# Patient Record
Sex: FEMALE | Race: WHITE | Employment: OTHER | URBAN - METROPOLITAN AREA
[De-identification: names, ages, dates, MRNs, and addresses within clinical notes are randomized per-mention and may not be internally consistent; named-entity substitution may affect disease eponyms.]

---

## 2017-08-21 ENCOUNTER — HOSPITAL ENCOUNTER (OUTPATIENT)
Dept: SURGERY | Age: 77
Discharge: HOME | End: 2017-08-21
Attending: ORTHOPAEDIC SURGERY | Admitting: ORTHOPAEDIC SURGERY

## 2017-08-21 ENCOUNTER — TELEPHONE (OUTPATIENT)
Dept: ORTHOPEDIC SURGERY | Age: 77
End: 2017-08-21

## 2017-08-21 VITALS
HEART RATE: 60 BPM | RESPIRATION RATE: 16 BRPM | SYSTOLIC BLOOD PRESSURE: 178 MMHG | BODY MASS INDEX: 24.28 KG/M2 | WEIGHT: 141.43 LBS | DIASTOLIC BLOOD PRESSURE: 68 MMHG | OXYGEN SATURATION: 97 % | TEMPERATURE: 96.9 F

## 2017-08-21 LAB
ANION GAP SERPL CALCULATED.3IONS-SCNC: 15 MMOL/L (ref 3–16)
BUN BLDV-MCNC: 70 MG/DL (ref 7–20)
CALCIUM SERPL-MCNC: 9.4 MG/DL (ref 8.3–10.6)
CHLORIDE BLD-SCNC: 100 MMOL/L (ref 99–110)
CO2: 27 MMOL/L (ref 21–32)
CREAT SERPL-MCNC: 1.6 MG/DL (ref 0.6–1.2)
GFR AFRICAN AMERICAN: 38
GFR NON-AFRICAN AMERICAN: 31
GLUCOSE BLD-MCNC: 116 MG/DL (ref 70–99)
HCT VFR BLD CALC: 35.4 % (ref 36–48)
HEMOGLOBIN: 11.9 G/DL (ref 12–16)
MCH RBC QN AUTO: 32.2 PG (ref 26–34)
MCHC RBC AUTO-ENTMCNC: 33.7 G/DL (ref 31–36)
MCV RBC AUTO: 95.5 FL (ref 80–100)
PDW BLD-RTO: 14.8 % (ref 12.4–15.4)
PLATELET # BLD: 146 K/UL (ref 135–450)
PMV BLD AUTO: 10.3 FL (ref 5–10.5)
POTASSIUM SERPL-SCNC: 4 MMOL/L (ref 3.5–5.1)
RBC # BLD: 3.7 M/UL (ref 4–5.2)
SODIUM BLD-SCNC: 142 MMOL/L (ref 136–145)
WBC # BLD: 10 K/UL (ref 4–11)

## 2017-08-21 PROCEDURE — 99204 OFFICE O/P NEW MOD 45 MIN: CPT | Performed by: ORTHOPAEDIC SURGERY

## 2017-08-21 PROCEDURE — 25609 OPTX DST RD XART FX/EP SEP3+: CPT | Performed by: NURSE PRACTITIONER

## 2017-08-21 PROCEDURE — 25609 OPTX DST RD XART FX/EP SEP3+: CPT | Performed by: ORTHOPAEDIC SURGERY

## 2017-08-21 RX ORDER — FENTANYL CITRATE 50 UG/ML
50 INJECTION, SOLUTION INTRAMUSCULAR; INTRAVENOUS EVERY 5 MIN PRN
Status: DISCONTINUED | OUTPATIENT
Start: 2017-08-21 | End: 2017-08-22 | Stop reason: HOSPADM

## 2017-08-21 RX ORDER — SODIUM CHLORIDE 0.9 % (FLUSH) 0.9 %
10 SYRINGE (ML) INJECTION EVERY 12 HOURS SCHEDULED
Status: DISCONTINUED | OUTPATIENT
Start: 2017-08-21 | End: 2017-08-22 | Stop reason: HOSPADM

## 2017-08-21 RX ORDER — CEPHALEXIN 500 MG/1
500 CAPSULE ORAL 4 TIMES DAILY
Qty: 40 CAPSULE | Refills: 0 | Status: SHIPPED | OUTPATIENT
Start: 2017-08-21 | End: 2017-08-26

## 2017-08-21 RX ORDER — SODIUM CHLORIDE 9 MG/ML
INJECTION, SOLUTION INTRAVENOUS CONTINUOUS
Status: DISCONTINUED | OUTPATIENT
Start: 2017-08-21 | End: 2017-08-22 | Stop reason: HOSPADM

## 2017-08-21 RX ORDER — PROMETHAZINE HYDROCHLORIDE 25 MG/1
25 TABLET ORAL EVERY 6 HOURS PRN
Qty: 60 TABLET | Refills: 0 | Status: ON HOLD | OUTPATIENT
Start: 2017-08-21 | End: 2018-12-15 | Stop reason: ALTCHOICE

## 2017-08-21 RX ORDER — LABETALOL HYDROCHLORIDE 5 MG/ML
5 INJECTION, SOLUTION INTRAVENOUS EVERY 10 MIN PRN
Status: DISCONTINUED | OUTPATIENT
Start: 2017-08-21 | End: 2017-08-22 | Stop reason: HOSPADM

## 2017-08-21 RX ORDER — HYDROCODONE BITARTRATE AND ACETAMINOPHEN 5; 325 MG/1; MG/1
1 TABLET ORAL EVERY 6 HOURS PRN
Qty: 60 TABLET | Refills: 0 | Status: SHIPPED | OUTPATIENT
Start: 2017-08-21 | End: 2018-04-04

## 2017-08-21 RX ORDER — PROMETHAZINE HYDROCHLORIDE 25 MG/ML
6.25 INJECTION, SOLUTION INTRAMUSCULAR; INTRAVENOUS
Status: ACTIVE | OUTPATIENT
Start: 2017-08-21 | End: 2017-08-21

## 2017-08-21 RX ORDER — FENTANYL CITRATE 50 UG/ML
25 INJECTION, SOLUTION INTRAMUSCULAR; INTRAVENOUS EVERY 5 MIN PRN
Status: DISCONTINUED | OUTPATIENT
Start: 2017-08-21 | End: 2017-08-22 | Stop reason: HOSPADM

## 2017-08-21 RX ORDER — SODIUM CHLORIDE 0.9 % (FLUSH) 0.9 %
10 SYRINGE (ML) INJECTION PRN
Status: DISCONTINUED | OUTPATIENT
Start: 2017-08-21 | End: 2017-08-22 | Stop reason: HOSPADM

## 2017-08-21 RX ADMIN — FENTANYL CITRATE 25 MCG: 50 INJECTION, SOLUTION INTRAMUSCULAR; INTRAVENOUS at 12:24

## 2017-08-21 RX ADMIN — SODIUM CHLORIDE: 9 INJECTION, SOLUTION INTRAVENOUS at 10:11

## 2017-08-21 ASSESSMENT — PAIN SCALES - GENERAL
PAINLEVEL_OUTOF10: 4
PAINLEVEL_OUTOF10: 5

## 2017-08-21 ASSESSMENT — ENCOUNTER SYMPTOMS: SHORTNESS OF BREATH: 0

## 2017-08-21 ASSESSMENT — PAIN DESCRIPTION - FREQUENCY: FREQUENCY: INTERMITTENT

## 2017-08-21 ASSESSMENT — PAIN DESCRIPTION - PROGRESSION: CLINICAL_PROGRESSION: NOT CHANGED

## 2017-08-21 ASSESSMENT — PAIN DESCRIPTION - ORIENTATION: ORIENTATION: LEFT

## 2017-08-21 ASSESSMENT — PAIN DESCRIPTION - DESCRIPTORS: DESCRIPTORS: ACHING;DISCOMFORT

## 2017-08-21 ASSESSMENT — PAIN DESCRIPTION - PAIN TYPE: TYPE: ACUTE PAIN;SURGICAL PAIN

## 2017-08-21 ASSESSMENT — PAIN DESCRIPTION - LOCATION: LOCATION: WRIST

## 2017-08-21 ASSESSMENT — PAIN - FUNCTIONAL ASSESSMENT: PAIN_FUNCTIONAL_ASSESSMENT: 0-10

## 2017-09-06 ENCOUNTER — OFFICE VISIT (OUTPATIENT)
Dept: ORTHOPEDIC SURGERY | Age: 77
End: 2017-09-06

## 2017-09-06 DIAGNOSIS — S52.532A CLOSED COLLES' FRACTURE OF LEFT RADIUS, INITIAL ENCOUNTER: ICD-10-CM

## 2017-09-06 DIAGNOSIS — M25.532 LEFT WRIST PAIN: Primary | ICD-10-CM

## 2017-09-06 PROCEDURE — 99024 POSTOP FOLLOW-UP VISIT: CPT | Performed by: ORTHOPAEDIC SURGERY

## 2017-09-06 PROCEDURE — L3908 WHO COCK-UP NONMOLDE PRE OTS: HCPCS | Performed by: ORTHOPAEDIC SURGERY

## 2017-09-20 ENCOUNTER — OFFICE VISIT (OUTPATIENT)
Dept: ORTHOPEDIC SURGERY | Age: 77
End: 2017-09-20

## 2017-09-20 VITALS
WEIGHT: 141 LBS | BODY MASS INDEX: 24.07 KG/M2 | HEIGHT: 64 IN | HEART RATE: 52 BPM | SYSTOLIC BLOOD PRESSURE: 179 MMHG | DIASTOLIC BLOOD PRESSURE: 82 MMHG | RESPIRATION RATE: 16 BRPM

## 2017-09-20 DIAGNOSIS — M25.561 ACUTE PAIN OF BOTH KNEES: ICD-10-CM

## 2017-09-20 DIAGNOSIS — M25.552 HIP PAIN, ACUTE, LEFT: Primary | ICD-10-CM

## 2017-09-20 DIAGNOSIS — M25.562 ACUTE PAIN OF BOTH KNEES: ICD-10-CM

## 2017-09-20 PROCEDURE — 73562 X-RAY EXAM OF KNEE 3: CPT | Performed by: ORTHOPAEDIC SURGERY

## 2017-09-20 PROCEDURE — 72170 X-RAY EXAM OF PELVIS: CPT | Performed by: ORTHOPAEDIC SURGERY

## 2017-09-20 PROCEDURE — 99214 OFFICE O/P EST MOD 30 MIN: CPT | Performed by: ORTHOPAEDIC SURGERY

## 2017-09-20 RX ORDER — GABAPENTIN 100 MG/1
CAPSULE ORAL 3 TIMES DAILY
Status: ON HOLD | COMMUNITY
Start: 2017-07-06 | End: 2018-12-18 | Stop reason: HOSPADM

## 2017-10-05 ENCOUNTER — OFFICE VISIT (OUTPATIENT)
Dept: ORTHOPEDIC SURGERY | Age: 77
End: 2017-10-05

## 2017-10-05 VITALS
BODY MASS INDEX: 24.07 KG/M2 | WEIGHT: 141 LBS | SYSTOLIC BLOOD PRESSURE: 167 MMHG | HEART RATE: 53 BPM | DIASTOLIC BLOOD PRESSURE: 74 MMHG | HEIGHT: 64 IN

## 2017-10-05 DIAGNOSIS — M54.5 LOW BACK PAIN, UNSPECIFIED BACK PAIN LATERALITY, UNSPECIFIED CHRONICITY, WITH SCIATICA PRESENCE UNSPECIFIED: Primary | ICD-10-CM

## 2017-10-05 DIAGNOSIS — M70.62 GREATER TROCHANTERIC BURSITIS OF LEFT HIP: ICD-10-CM

## 2017-10-05 PROCEDURE — 99213 OFFICE O/P EST LOW 20 MIN: CPT | Performed by: NURSE PRACTITIONER

## 2017-10-05 NOTE — PROGRESS NOTES
Kamila Andrews  [id number]  October 5, 2017    Chief Complaint   Patient presents with    Back Pain     LBP       History: Ms. Kamila Andrews is a pleasant 65 y.o. old female here regarding her left hip. This pain extends from her lateral hip into her lateral knee. She does note some mild intermittent pain that extends past her knee into her lateral calf, but reports this is not her significant issue. She denies any associated low back pain. She denies any associated numbness, tingling or weakness. She does feel as though when the pain she strikes her leg her leg can give out. She is ambulating with a cane which is new for her and she does not yet feel comfortable with this. She did sustain a left distal radius fracture was treated with ORIF approximately 1.5 months ago. She has been taking Percocet for pain control and is unable take NSAIDs due to chronic kidney disease. This is a consult for left hip pain from Dr. Holly Ta. The patient's  past medical history, medications, allergies,  family history, social history, and review of systems have been reviewed, and dated and are recorded in the chart. Vitals:  BP (!) 167/74  Pulse 53  Ht 5' 4\" (1.626 m)  Wt 141 lb (64 kg)  Breastfeeding? No  BMI 24.2 kg/m2    Physical: Ms. Kamila Andrews appears well, she is in no apparent distress, she demonstrates appropriate mood & affect. The patient is non tender to palpation of the lumbar spine. Leg lengths are symmetric. She has severe tenderness to palpation over the left greater trochanter. She has pain with resisted abduction of the left hip and passive adduction. Range of motion of the hips is full. Examination of the skin reveals no rashes, ulcerations, or lesions, bilaterally in the lower extremities. Sensation to both lower extremities is grossly intact. Exam of both feet reveals pedal pulses intact and brisk cap refill. Patient is able to dorsiflex and wiggle all toes.   Deep tendon reflexes

## 2017-10-18 ENCOUNTER — OFFICE VISIT (OUTPATIENT)
Dept: ORTHOPEDIC SURGERY | Age: 77
End: 2017-10-18

## 2017-10-18 VITALS — WEIGHT: 145 LBS | RESPIRATION RATE: 16 BRPM | BODY MASS INDEX: 24.75 KG/M2 | HEIGHT: 64 IN

## 2017-10-18 DIAGNOSIS — S52.572A OTHER CLOSED INTRA-ARTICULAR FRACTURE OF DISTAL END OF LEFT RADIUS, INITIAL ENCOUNTER: Primary | ICD-10-CM

## 2017-10-18 PROCEDURE — 99024 POSTOP FOLLOW-UP VISIT: CPT | Performed by: NURSE PRACTITIONER

## 2017-10-18 NOTE — LETTER
As this patient has demonstrated risk factors for osteoporosis, such as age greater than [de-identified] years and evidence of a fracture, I have referred the patient back to the primary care physician for evaluation for osteoporosis, including consideration for DEXA scanning, if this is felt to be clinically indicated. The patient is advised to contact the primary care physician to follow-up for further evaluation. Juliana Ramon CNP    If you have questions, please do not hesitate to call me. I look forward to following Zetta Brochure along with you.     Sincerely,        Brendan Bryan MD

## 2017-10-23 NOTE — PROGRESS NOTES
DIAGNOSIS:  Left distal radius 3 parts intra articular displaced fracture, status post ORIF. DATE OF SURGERY:  8/21/2017. HISTORY OF PRESENT ILLNESS:  Ms. Gautam Stable 65 y.o.  female right handed returns today  for 6 weeks postoperative visit. The patient denies any significant pain in the left wrist. Rates pain a 0/10 VAS but does have discomfort when twisting her wrist. Pain is improving. Pain is worse with movement and better with ROM. No numbness or tingling sensation. No fever or Chills. She  is in a brace. PHYSICAL EXAMINATION:  The incision is completely healed . No signs of any erythema or drainage. She  has no pain with the active or passive range of motion of the left wrist, but decrease ROM. She  has intact sensation, distally, and she  is neurovascularly intact. IMAGING:  Three views left wrist taken today in the office showed anatomic alignment of left distal radius, plate and screws in good position, no loosening. IMPRESSION:  6 weeks out from left  Distal radius 3 parts intra articular displaced fracture, ORIF and doing very well. PLAN:  I have told the patient to work on ROM, as well as strengthening exercises. She can discontinue the brace. No heavy impact activities. The patient will come back for a follow up in 6 weeks. At that time, we will take 3 views of the left wrist. PT if needed then. As this patient has demonstrated risk factors for osteoporosis, such as age greater than [de-identified] years and evidence of a fracture, I have referred the patient back to the primary care physician for evaluation for osteoporosis, including consideration for DEXA scanning, if this is felt to be clinically indicated. The patient is advised to contact the primary care physician to follow-up for further evaluation.        Dasha Chavez, CNP

## 2017-11-29 ENCOUNTER — OFFICE VISIT (OUTPATIENT)
Dept: ORTHOPEDIC SURGERY | Age: 77
End: 2017-11-29

## 2017-11-29 VITALS — HEIGHT: 64 IN | RESPIRATION RATE: 16 BRPM | BODY MASS INDEX: 24.75 KG/M2 | WEIGHT: 145 LBS

## 2017-11-29 DIAGNOSIS — S52.572A OTHER CLOSED INTRA-ARTICULAR FRACTURE OF DISTAL END OF LEFT RADIUS, INITIAL ENCOUNTER: Primary | ICD-10-CM

## 2017-11-29 PROCEDURE — G8427 DOCREV CUR MEDS BY ELIG CLIN: HCPCS | Performed by: ORTHOPAEDIC SURGERY

## 2017-11-29 PROCEDURE — 99213 OFFICE O/P EST LOW 20 MIN: CPT | Performed by: ORTHOPAEDIC SURGERY

## 2017-11-29 PROCEDURE — 1090F PRES/ABSN URINE INCON ASSESS: CPT | Performed by: ORTHOPAEDIC SURGERY

## 2017-11-29 PROCEDURE — 1036F TOBACCO NON-USER: CPT | Performed by: ORTHOPAEDIC SURGERY

## 2017-11-29 PROCEDURE — G8400 PT W/DXA NO RESULTS DOC: HCPCS | Performed by: ORTHOPAEDIC SURGERY

## 2017-11-29 PROCEDURE — G8598 ASA/ANTIPLAT THER USED: HCPCS | Performed by: ORTHOPAEDIC SURGERY

## 2017-11-29 PROCEDURE — 1123F ACP DISCUSS/DSCN MKR DOCD: CPT | Performed by: ORTHOPAEDIC SURGERY

## 2017-11-29 PROCEDURE — G8420 CALC BMI NORM PARAMETERS: HCPCS | Performed by: ORTHOPAEDIC SURGERY

## 2017-11-29 PROCEDURE — 4040F PNEUMOC VAC/ADMIN/RCVD: CPT | Performed by: ORTHOPAEDIC SURGERY

## 2017-11-29 PROCEDURE — G8484 FLU IMMUNIZE NO ADMIN: HCPCS | Performed by: ORTHOPAEDIC SURGERY

## 2017-11-29 NOTE — LETTER
[address] [facility]  [address] [facility] [address]  Phone: [phone]  Fax: [phone]    Sabino Olszewski, MD        December 11, 2017     Judge Marquita MD  [address] [place] [address]    Patient: Shante Cummings  MR Number: [id number]  YOB: 1940  Date of Visit: 11/29/2017    Dear Dr. Judge Juárez:    Thank you for the request for consultation for [place] to me for  evaluation. Below are the relevant portions of my assessment and plan of care. DIAGNOSIS:  Left distal radius 3 parts intra articular displaced fracture, status post ORIF. DATE OF SURGERY:  8/21/2017. HISTORY OF PRESENT ILLNESS:  Ms. Yesenia Damon 65 y.o.  female right handed returns today  for 3 months postoperative visit. The patient denies any significant pain in the left wrist. Rates pain a 0/10 VAS denies any problems and is doing very well. No numbness or tingling sensation. No fever or Chills. Past Medical History:   Diagnosis Date    Cancer ([address]     High blood pressure     Kidney trouble     [facility] [facility]     Shingles        Past Surgical History:   Procedure Laterality Date    BREAST SURGERY      HYSTERECTOMY      SHOULDER SURGERY      WRIST FRACTURE SURGERY Left 08/21/2017    ORIF left distal radius       Social History     Social History    Marital status:       Spouse name: N/A    Number of children: 1    Years of education: N/A     Occupational History    Homemaker      Social History Main Topics    Smoking status: Never Smoker    Smokeless tobacco: Never Used    Alcohol use No    Drug use: No    Sexual activity: Not on file     Other Topics Concern    Not on file     Social History Narrative    No narrative on file       Family History   Problem Relation Age of Onset    Cancer Mother     Stroke Mother     Cancer Father     High Blood Pressure Father Current Outpatient Prescriptions on File Prior to Visit   Medication Sig Dispense Refill    gabapentin (NEURONTIN) 100 MG capsule       HYDROcodone-acetaminophen (NORCO) 5-325 MG per tablet Take 1 tablet by mouth every 6 hours as needed for Pain . 60 tablet 0    promethazine (PHENERGAN) 25 MG tablet Take 1 tablet by mouth every 6 hours as needed for Nausea 60 tablet 0    Ascorbic Acid (VITAMIN C) 500 MG tablet Take 500 mg by mouth daily      Cholecalciferol (VITAMIN D3) 2000 UNITS CAPS Take 1 capsule by mouth      predniSONE (DELTASONE) 5 MG tablet Take 5 mg by mouth daily      hydrochlorothiazide (HYDRODIURIL) 50 MG tablet Take 1 tablet by mouth daily Do not resume until cleared by nephrology (Patient taking differently: Take 25 mg by mouth daily Do not resume until cleared by nephrology) 30 tablet 3    aspirin 81 MG chewable tablet Take 81 mg by mouth daily      atorvastatin (LIPITOR) 20 MG tablet Take 20 mg by mouth daily      levothyroxine (SYNTHROID) 25 MCG tablet Take 25 mcg by mouth Daily      metoprolol (LOPRESSOR) 25 MG tablet Take 25 mg by mouth 2 times daily Pt stated that she is taking 1/2 a pill two times a day      amlodipine (NORVASC) 10 MG tablet Take 10 mg by mouth daily.  calcitRIOL (ROCALTROL) 0.25 MCG capsule Take 0.25 mcg by mouth daily.  calcium-vitamin D (OSCAL-500) 500-200 MG-UNIT per tablet Take 1 tablet by mouth daily. No current facility-administered medications on file prior to visit. Pertinent items are noted in HPI  Review of systems reviewed from Patient History Form dated on 9/6/2017 and available in the patient's chart under the Media tab. No changes noted       PHYSICAL EXAMINATION:  Ms. Irving Guzman is a very pleasant 65 y.o.  female who presents today in no acute distress, awake, alert, and oriented. She is well dressed, nourished and  groomed. Patient with normal affect.   Height is  5' 4\"

## 2017-11-30 NOTE — PROGRESS NOTES
DIAGNOSIS:  Left distal radius 3 parts intra articular displaced fracture, status post ORIF. DATE OF SURGERY:  8/21/2017. HISTORY OF PRESENT ILLNESS:  Ms. Smitha Whitley 65 y.o.  female right handed returns today  for 3 months postoperative visit. The patient denies any significant pain in the left wrist. Rates pain a 0/10 VAS denies any problems and is doing very well. No numbness or tingling sensation. No fever or Chills. Past Medical History:   Diagnosis Date    [facility]     High blood pressure     Kidney trouble     Seizure ([facility] [facility]     Shingles        Past Surgical History:   Procedure Laterality Date    BREAST SURGERY      HYSTERECTOMY      SHOULDER SURGERY      WRIST FRACTURE SURGERY Left 08/21/2017    ORIF left distal radius       Social History     Social History    Marital status:      Spouse name: N/A    Number of children: 1    Years of education: N/A     Occupational History    Homemaker      Social History Main Topics    Smoking status: Never Smoker    Smokeless tobacco: Never Used    Alcohol use No    Drug use: No    Sexual activity: Not on file     Other Topics Concern    Not on file     Social History Narrative    No narrative on file       Family History   Problem Relation Age of Onset    Cancer Mother     Stroke Mother     Cancer Father     High Blood Pressure Father        Current Outpatient Prescriptions on File Prior to Visit   Medication Sig Dispense Refill    gabapentin (NEURONTIN) 100 MG capsule       HYDROcodone-acetaminophen (NORCO) 5-325 MG per tablet Take 1 tablet by mouth every 6 hours as needed for Pain .  60 tablet 0    promethazine (PHENERGAN) 25 MG tablet Take 1 tablet by mouth every 6 hours as needed for Nausea 60 tablet 0    Ascorbic Acid (VITAMIN C) 500 MG tablet Take 500 mg by mouth daily      Cholecalciferol (VITAMIN D3) 2000 UNITS CAPS Take 1 capsule by mouth      predniSONE (DELTASONE) 5 MG tablet Take 5 mg by mouth daily      hydrochlorothiazide (HYDRODIURIL) 50 MG tablet Take 1 tablet by mouth daily Do not resume until cleared by nephrology (Patient taking differently: Take 25 mg by mouth daily Do not resume until cleared by nephrology) 30 tablet 3    aspirin 81 MG chewable tablet Take 81 mg by mouth daily      atorvastatin (LIPITOR) 20 MG tablet Take 20 mg by mouth daily      levothyroxine (SYNTHROID) 25 MCG tablet Take 25 mcg by mouth Daily      metoprolol (LOPRESSOR) 25 MG tablet Take 25 mg by mouth 2 times daily Pt stated that she is taking 1/2 a pill two times a day      amlodipine (NORVASC) 10 MG tablet Take 10 mg by mouth daily.  calcitRIOL (ROCALTROL) 0.25 MCG capsule Take 0.25 mcg by mouth daily.  calcium-vitamin D (OSCAL-500) 500-200 MG-UNIT per tablet Take 1 tablet by mouth daily. No current facility-administered medications on file prior to visit. Pertinent items are noted in HPI  Review of systems reviewed from Patient History Form dated on 9/6/2017 and available in the patient's chart under the Media tab. No changes noted       PHYSICAL EXAMINATION:  Ms. Jammie Grya is a very pleasant 65 y.o.  female who presents today in no acute distress, awake, alert, and oriented. She is well dressed, nourished and  groomed. Patient with normal affect. Height is  5' 4\" (1.626 m), weight is 145 lb (65.8 kg), Body mass index is 24.89 kg/m². Resting respiratory rate is 16, no distress. Pulse 70 regular rate. The patient walks with assistance of a cane. The incision is completely healed . No signs of any erythema or drainage. She  has no pain with the active or passive range of motion of the left wrist, full ROM. She  has intact sensation, distally, and she  is neurovascularly intact. Good strength, and no instability both upper and lower extremities.     IMAGING:  Three views left wrist taken today in the office showed anatomic alignment of left distal radius, plate and screws in good position, no loosening. IMPRESSION:  3 months out from left  Distal radius 3 parts intra articular displaced fracture, ORIF and doing very well. PLAN:  She can go back to normal activity with no restrictions. She will be seen PRN. I told the patient that it is not unusual to have some achy pain and swelling for up to a year after a fracture. As this patient has demonstrated risk factors for osteoporosis, such as age greater than [de-identified] years and evidence of a fracture, I have referred the patient back to the primary care physician for evaluation for osteoporosis, including consideration for DEXA scanning, if this is felt to be clinically indicated. The patient is advised to contact the primary care physician to follow-up for further evaluation.        Melani Jeff MD

## 2017-12-06 ENCOUNTER — OFFICE VISIT (OUTPATIENT)
Dept: ORTHOPEDIC SURGERY | Age: 77
End: 2017-12-06

## 2017-12-06 VITALS
DIASTOLIC BLOOD PRESSURE: 56 MMHG | BODY MASS INDEX: 24.07 KG/M2 | HEART RATE: 40 BPM | SYSTOLIC BLOOD PRESSURE: 148 MMHG | WEIGHT: 141 LBS | HEIGHT: 64 IN

## 2017-12-06 DIAGNOSIS — M54.2 NECK PAIN: Primary | ICD-10-CM

## 2017-12-06 DIAGNOSIS — M47.812 OSTEOARTHRITIS OF CERVICAL SPINE, UNSPECIFIED SPINAL OSTEOARTHRITIS COMPLICATION STATUS: ICD-10-CM

## 2017-12-06 PROCEDURE — 99213 OFFICE O/P EST LOW 20 MIN: CPT | Performed by: NURSE PRACTITIONER

## 2017-12-06 PROCEDURE — G8400 PT W/DXA NO RESULTS DOC: HCPCS | Performed by: NURSE PRACTITIONER

## 2017-12-06 PROCEDURE — 1036F TOBACCO NON-USER: CPT | Performed by: NURSE PRACTITIONER

## 2017-12-06 PROCEDURE — G8484 FLU IMMUNIZE NO ADMIN: HCPCS | Performed by: NURSE PRACTITIONER

## 2017-12-06 PROCEDURE — G8598 ASA/ANTIPLAT THER USED: HCPCS | Performed by: NURSE PRACTITIONER

## 2017-12-06 PROCEDURE — 1123F ACP DISCUSS/DSCN MKR DOCD: CPT | Performed by: NURSE PRACTITIONER

## 2017-12-06 PROCEDURE — 4040F PNEUMOC VAC/ADMIN/RCVD: CPT | Performed by: NURSE PRACTITIONER

## 2017-12-06 PROCEDURE — G8420 CALC BMI NORM PARAMETERS: HCPCS | Performed by: NURSE PRACTITIONER

## 2017-12-06 PROCEDURE — G8427 DOCREV CUR MEDS BY ELIG CLIN: HCPCS | Performed by: NURSE PRACTITIONER

## 2017-12-06 PROCEDURE — 1090F PRES/ABSN URINE INCON ASSESS: CPT | Performed by: NURSE PRACTITIONER

## 2017-12-07 NOTE — PROGRESS NOTES
is intact to light touchfrom C6 to C8. She has no clonus and negative Ruiz's bilaterally. Imaging:   I reviewed 2 views of the cervical spine obtained in the office today. They show kyphosis with diffuse degenerative changes about the cervical spine. Assessment:   Cervical spondylosis with degenerative kyphosis     Plan:   We discussed treatment options. I recommend she try a formal PT program.  She will call if no relief in the next month and we will obtain MRI without trung of cervical spine. She may be good candidate for cervical MBB.

## 2018-12-14 ENCOUNTER — APPOINTMENT (OUTPATIENT)
Dept: CT IMAGING | Age: 78
DRG: 309 | End: 2018-12-14
Payer: MEDICARE

## 2018-12-14 ENCOUNTER — APPOINTMENT (OUTPATIENT)
Dept: GENERAL RADIOLOGY | Age: 78
DRG: 309 | End: 2018-12-14
Payer: MEDICARE

## 2018-12-14 ENCOUNTER — HOSPITAL ENCOUNTER (INPATIENT)
Age: 78
LOS: 7 days | Discharge: HOME HEALTH | DRG: 309 | End: 2018-12-21
Attending: EMERGENCY MEDICINE | Admitting: INTERNAL MEDICINE
Payer: MEDICARE

## 2018-12-14 DIAGNOSIS — S00.93XA CONTUSION OF HEAD, UNSPECIFIED PART OF HEAD, INITIAL ENCOUNTER: ICD-10-CM

## 2018-12-14 DIAGNOSIS — R53.1 GENERALIZED WEAKNESS: ICD-10-CM

## 2018-12-14 DIAGNOSIS — R77.8 ELEVATED TROPONIN: ICD-10-CM
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
## 2018-12-14 PROBLEM — R29.6 RECURRENT FALLS: Status: ACTIVE | Noted: 2018-12-14

## 2018-12-14 LAB
ANION GAP SERPL CALCULATED.3IONS-SCNC: 12 MMOL/L (ref 3–16)
BASOPHILS ABSOLUTE: 0 K/UL (ref 0–0.2)
BASOPHILS RELATIVE PERCENT: 0.1 %
BUN BLDV-MCNC: 58 MG/DL (ref 7–20)
CALCIUM SERPL-MCNC: 9.1 MG/DL (ref 8.3–10.6)
CHLORIDE BLD-SCNC: 100 MMOL/L (ref 99–110)
CO2: 32 MMOL/L (ref 21–32)
CREAT SERPL-MCNC: 2 MG/DL (ref 0.6–1.2)
EKG ATRIAL RATE: 43 BPM
EKG DIAGNOSIS: NORMAL
EKG Q-T INTERVAL: 482 MS
EKG QRS DURATION: 136 MS
EKG QTC CALCULATION (BAZETT): 421 MS
EKG R AXIS: -61 DEGREES
EKG T AXIS: -2 DEGREES
EKG VENTRICULAR RATE: 46 BPM
EOSINOPHILS ABSOLUTE: 0 K/UL (ref 0–0.6)
EOSINOPHILS RELATIVE PERCENT: 0.2 %
GFR AFRICAN AMERICAN: 29
GFR NON-AFRICAN AMERICAN: 24
GLUCOSE BLD-MCNC: 77 MG/DL (ref 70–99)
HCT VFR BLD CALC: 30.3 % (ref 36–48)
HEMOGLOBIN: 10 G/DL (ref 12–16)
LYMPHOCYTES ABSOLUTE: 0.9 K/UL (ref 1–5.1)
LYMPHOCYTES RELATIVE PERCENT: 6.4 %
MCH RBC QN AUTO: 31.3 PG (ref 26–34)
MCHC RBC AUTO-ENTMCNC: 33 G/DL (ref 31–36)
MCV RBC AUTO: 95.1 FL (ref 80–100)
MONOCYTES ABSOLUTE: 1.1 K/UL (ref 0–1.3)
MONOCYTES RELATIVE PERCENT: 8.3 %
NEUTROPHILS ABSOLUTE: 11.7 K/UL (ref 1.7–7.7)
NEUTROPHILS RELATIVE PERCENT: 85 %
PDW BLD-RTO: 13.8 % (ref 12.4–15.4)
PLATELET # BLD: 133 K/UL (ref 135–450)
PMV BLD AUTO: 10.3 FL (ref 5–10.5)
POTASSIUM SERPL-SCNC: 3.5 MMOL/L (ref 3.5–5.1)
RBC # BLD: 3.19 M/UL (ref 4–5.2)
SODIUM BLD-SCNC: 144 MMOL/L (ref 136–145)
TOTAL CK: 126 U/L (ref 26–192)
TROPONIN: 0.11 NG/ML
TROPONIN: 0.12 NG/ML
WBC # BLD: 13.8 K/UL (ref 4–11)

## 2018-12-14 PROCEDURE — 71045 X-RAY EXAM CHEST 1 VIEW: CPT

## 2018-12-14 PROCEDURE — 6370000000 HC RX 637 (ALT 250 FOR IP): Performed by: INTERNAL MEDICINE

## 2018-12-14 PROCEDURE — 82550 ASSAY OF CK (CPK): CPT

## 2018-12-14 PROCEDURE — 85025 COMPLETE CBC W/AUTO DIFF WBC: CPT

## 2018-12-14 PROCEDURE — 94664 DEMO&/EVAL PT USE INHALER: CPT

## 2018-12-14 PROCEDURE — 73502 X-RAY EXAM HIP UNI 2-3 VIEWS: CPT

## 2018-12-14 PROCEDURE — 72125 CT NECK SPINE W/O DYE: CPT

## 2018-12-14 PROCEDURE — 2060000000 HC ICU INTERMEDIATE R&B

## 2018-12-14 PROCEDURE — 93010 ELECTROCARDIOGRAM REPORT: CPT | Performed by: INTERNAL MEDICINE

## 2018-12-14 PROCEDURE — 99285 EMERGENCY DEPT VISIT HI MDM: CPT

## 2018-12-14 PROCEDURE — 80048 BASIC METABOLIC PNL TOTAL CA: CPT

## 2018-12-14 PROCEDURE — 93005 ELECTROCARDIOGRAM TRACING: CPT | Performed by: PHYSICIAN ASSISTANT

## 2018-12-14 PROCEDURE — 70450 CT HEAD/BRAIN W/O DYE: CPT

## 2018-12-14 PROCEDURE — 73560 X-RAY EXAM OF KNEE 1 OR 2: CPT

## 2018-12-14 PROCEDURE — 2580000003 HC RX 258: Performed by: INTERNAL MEDICINE

## 2018-12-14 PROCEDURE — 36415 COLL VENOUS BLD VENIPUNCTURE: CPT

## 2018-12-14 PROCEDURE — 99223 1ST HOSP IP/OBS HIGH 75: CPT | Performed by: INTERNAL MEDICINE

## 2018-12-14 PROCEDURE — 84484 ASSAY OF TROPONIN QUANT: CPT

## 2018-12-14 PROCEDURE — 93005 ELECTROCARDIOGRAM TRACING: CPT | Performed by: INTERNAL MEDICINE

## 2018-12-14 RX ORDER — GABAPENTIN 100 MG/1
100 CAPSULE ORAL 3 TIMES DAILY
Status: DISCONTINUED | OUTPATIENT
Start: 2018-12-14 | End: 2018-12-14

## 2018-12-14 RX ORDER — ATORVASTATIN CALCIUM 20 MG/1
20 TABLET, FILM COATED ORAL DAILY
Status: DISCONTINUED | OUTPATIENT
Start: 2018-12-14 | End: 2018-12-14

## 2018-12-14 RX ORDER — ASCORBIC ACID 500 MG
500 TABLET ORAL DAILY
Status: DISCONTINUED | OUTPATIENT
Start: 2018-12-15 | End: 2018-12-21 | Stop reason: HOSPADM

## 2018-12-14 RX ORDER — CALCITRIOL 0.25 UG/1
0.25 CAPSULE, LIQUID FILLED ORAL DAILY
Status: DISCONTINUED | OUTPATIENT
Start: 2018-12-14 | End: 2018-12-21 | Stop reason: HOSPADM

## 2018-12-14 RX ORDER — FERROUS SULFATE TAB EC 324 MG (65 MG FE EQUIVALENT) 324 (65 FE) MG
325 TABLET DELAYED RESPONSE ORAL
Status: DISCONTINUED | OUTPATIENT
Start: 2018-12-15 | End: 2018-12-21 | Stop reason: HOSPADM

## 2018-12-14 RX ORDER — CALCIUM CARBONATE/VITAMIN D3 250-3.125
2 TABLET ORAL DAILY
Status: DISCONTINUED | OUTPATIENT
Start: 2018-12-14 | End: 2018-12-21 | Stop reason: HOSPADM

## 2018-12-14 RX ORDER — ATORVASTATIN CALCIUM 10 MG/1
10 TABLET, FILM COATED ORAL DAILY
Status: DISCONTINUED | OUTPATIENT
Start: 2018-12-15 | End: 2018-12-21 | Stop reason: HOSPADM

## 2018-12-14 RX ORDER — SODIUM CHLORIDE 0.9 % (FLUSH) 0.9 %
10 SYRINGE (ML) INJECTION EVERY 12 HOURS SCHEDULED
Status: DISCONTINUED | OUTPATIENT
Start: 2018-12-14 | End: 2018-12-21 | Stop reason: HOSPADM

## 2018-12-14 RX ORDER — FERROUS SULFATE 325(65) MG
325 TABLET ORAL
COMMUNITY
End: 2019-02-20

## 2018-12-14 RX ORDER — PROMETHAZINE HYDROCHLORIDE 25 MG/1
25 TABLET ORAL EVERY 6 HOURS PRN
Status: DISCONTINUED | OUTPATIENT
Start: 2018-12-14 | End: 2018-12-21 | Stop reason: HOSPADM

## 2018-12-14 RX ORDER — CITALOPRAM 10 MG/1
10 TABLET ORAL DAILY
Status: DISCONTINUED | OUTPATIENT
Start: 2018-12-14 | End: 2018-12-21 | Stop reason: HOSPADM

## 2018-12-14 RX ORDER — CITALOPRAM 10 MG/1
10 TABLET ORAL DAILY
COMMUNITY

## 2018-12-14 RX ORDER — LEVOTHYROXINE SODIUM 0.03 MG/1
25 TABLET ORAL DAILY
Status: DISCONTINUED | OUTPATIENT
Start: 2018-12-15 | End: 2018-12-21 | Stop reason: HOSPADM

## 2018-12-14 RX ORDER — ASPIRIN 81 MG/1
81 TABLET, CHEWABLE ORAL DAILY
Status: DISCONTINUED | OUTPATIENT
Start: 2018-12-14 | End: 2018-12-21 | Stop reason: HOSPADM

## 2018-12-14 RX ORDER — FUROSEMIDE 20 MG/1
10 TABLET ORAL DAILY
Status: DISCONTINUED | OUTPATIENT
Start: 2018-12-15 | End: 2018-12-17

## 2018-12-14 RX ORDER — SODIUM CHLORIDE 0.9 % (FLUSH) 0.9 %
10 SYRINGE (ML) INJECTION PRN
Status: DISCONTINUED | OUTPATIENT
Start: 2018-12-14 | End: 2018-12-21 | Stop reason: HOSPADM

## 2018-12-14 RX ORDER — ONDANSETRON 2 MG/ML
4 INJECTION INTRAMUSCULAR; INTRAVENOUS EVERY 6 HOURS PRN
Status: DISCONTINUED | OUTPATIENT
Start: 2018-12-14 | End: 2018-12-21 | Stop reason: HOSPADM

## 2018-12-14 RX ORDER — FUROSEMIDE 20 MG/1
10 TABLET ORAL DAILY
Status: ON HOLD | COMMUNITY
End: 2018-12-18 | Stop reason: HOSPADM

## 2018-12-14 RX ORDER — PREDNISONE 10 MG/1
10 TABLET ORAL DAILY
Status: DISCONTINUED | OUTPATIENT
Start: 2018-12-14 | End: 2018-12-21 | Stop reason: HOSPADM

## 2018-12-14 RX ORDER — ACETAMINOPHEN 325 MG/1
650 TABLET ORAL EVERY 4 HOURS PRN
Status: DISCONTINUED | OUTPATIENT
Start: 2018-12-14 | End: 2018-12-21 | Stop reason: HOSPADM

## 2018-12-14 RX ORDER — AMLODIPINE BESYLATE 10 MG/1
10 TABLET ORAL DAILY
Status: DISCONTINUED | OUTPATIENT
Start: 2018-12-15 | End: 2018-12-14

## 2018-12-14 RX ADMIN — Medication 10 ML: at 20:27

## 2018-12-14 RX ADMIN — ACETAMINOPHEN 650 MG: 325 TABLET ORAL at 20:26

## 2018-12-14 RX ADMIN — CITALOPRAM HYDROBROMIDE 10 MG: 10 TABLET ORAL at 18:08

## 2018-12-14 RX ADMIN — CALCIUM CARBONATE-CHOLECALCIFEROL TAB 250 MG-125 UNIT 500 MG: 250-125 TAB at 18:08

## 2018-12-14 RX ADMIN — PREDNISONE 10 MG: 10 TABLET ORAL at 18:08

## 2018-12-14 RX ADMIN — ASPIRIN 81 MG 81 MG: 81 TABLET ORAL at 18:07

## 2018-12-14 RX ADMIN — CALCITRIOL 0.25 MCG: 0.25 CAPSULE ORAL at 18:07

## 2018-12-14 ASSESSMENT — PAIN SCALES - GENERAL
PAINLEVEL_OUTOF10: 5
PAINLEVEL_OUTOF10: 0
PAINLEVEL_OUTOF10: 0
PAINLEVEL_OUTOF10: 8
PAINLEVEL_OUTOF10: 7

## 2018-12-14 ASSESSMENT — ENCOUNTER SYMPTOMS
APNEA: 0
ABDOMINAL PAIN: 0
EYE REDNESS: 0
EYE DISCHARGE: 0
VOMITING: 0
SHORTNESS OF BREATH: 0
FACIAL SWELLING: 0
CHOKING: 0
BACK PAIN: 0
NAUSEA: 0
SORE THROAT: 0

## 2018-12-14 ASSESSMENT — PAIN DESCRIPTION - ORIENTATION: ORIENTATION: RIGHT

## 2018-12-14 ASSESSMENT — PAIN DESCRIPTION - LOCATION: LOCATION: HEAD;FACE

## 2018-12-14 ASSESSMENT — PAIN DESCRIPTION - PAIN TYPE: TYPE: ACUTE PAIN

## 2018-12-14 NOTE — PROGRESS NOTES
Elio Vasquez MD  12/14/2018,   Sana Abbott MD    1940  Chief Complaint   Patient presents with    Generalized Body Aches     pt states she has  been weak for 2 months         Active Problems:    Recurrent falls  Resolved Problems:    * No resolved hospital problems. *     has a past medical history of Cancer ([address] High blood pressure; Kidney trouble; Seizure ([address] and Shingles. Past Surgical History:     has a past surgical history that includes Breast surgery; shoulder surgery; Hysterectomy; and Wrist fracture surgery (Left, 08/21/2017). ED REVIEW:        The patient tolerated their visit well. I evaluated the patient. The physician was available for consultation as needed. The patient and / or the family were informed of the results of anytests, a time was given to answer questions, a plan was proposed and they agreed with plan.       CLINICAL IMPRESSION:  1. Fall, initial encounter    2. Elevated troponin    3. Contusion of head, unspecified part of head, initial encounter    4. Generalized weakness          DISPOSITION Admitted 12/14/2018 01:14:31 PM       DR Garald Peabody: Physician Note:     I havepersonally performed and/or participated in the history, exam and medical decision making and agree with all pertinent clinical information.  I have also reviewed and agree with the past medical, family and social historyunless otherwise noted. I have personally performed a face to face diagnostic evaluation onthis patient. I have reviewed the mid-levels findings and agree.       History: This is a 34-year-old female who still lives at home. She has a history of a cardiomyopathy and a 50% LAD lesion diagnosis in January 2016. At that time she did have elevated troponins and that cardiac catheterization was done. She's been weak over the last couple of days. She took quite a fall at home struck her head. Complaining of being sore generally all over.   Recently had a beta

## 2018-12-14 NOTE — H&P
exhibits no distension. There is no tenderness. Musculoskeletal: Normal range of motion. Cervical back: She exhibits normal range of motion, no tenderness and no bony tenderness. Thoracic back: She exhibits normal range of motion, no tenderness and no bony tenderness. Lumbar back: She exhibits normal range of motion, no tenderness and no bony tenderness. Neurological: She is alert and oriented to person, place, and time. Skin: Skin is warm and dry. She is not diaphoretic. Psychiatric: She has a normal mood and affect. Her behavior is normal.       DATA:  CT Cervical Spine WO Contrast  Final Result  No evidence of fracture.     Straightening of the cervical spine may be positional related to muscle spasm  or strain.     Atypical sclerotic lesion of the left-sided skull base. Separate small  sclerotic lesion within the C4 vertebral body. If the patient has a history  of cancer, osteoblastic metastatic disease would be of concern. This could  be further evaluated with bone scan.        XR HIP 2-3 VW W PELVIS RIGHT  Final Result  1. No acute traumatic injury involving the pelvis, right hip, or right knee. 2. Given the degree of osteopenia, nondisplaced fractures may be  radiographically occult. If pain or concern for hip fracture persists,  consider MR imaging.        XR KNEE RIGHT (1-2 VIEWS)  Final Result  1. No acute traumatic injury involving the pelvis, right hip, or right knee. 2. Given the degree of osteopenia, nondisplaced fractures may be  radiographically occult. If pain or concern for hip fracture persists,  consider MR imaging.        CT Head WO Contrast  Final Result  No acute intracranial abnormality.     Mild generalized cerebral atrophy and chronic small vessel white matter  ischemic changes.        XR CHEST PORTABLE  Final Result  1. Stable mild cardiomegaly. 2. No acute focal airspace consolidation.   3. Bandlike atelectasis or scarring at the left base,

## 2018-12-14 NOTE — ED PROVIDER NOTES
following:     Troponin 0.11 (*)     All other components within normal limits    Narrative:     Performed at:  [facility]  [address] [place] [facility] [address]   Phone [phone]   CBC WITH AUTO DIFFERENTIAL - Abnormal; Notable for the following:     WBC 13.8 (*)     RBC 3.19 (*)     Hemoglobin 10.0 (*)     Hematocrit 30.3 (*)     Platelets 826 (*)     Neutrophils # 11.7 (*)     Lymphocytes # 0.9 (*)     All other components within normal limits    Narrative:     Performed at:  [facility]  [address] [place] [facility] [address]   Phone [phone]   [place]    Narrative:     Performed at:  [facility]  [address] [place] [facility] [address]   Phone [phone]   URINE RT REFLEX TO CULTURE   TROPONIN   TROPONIN        Remainder of labs reviewed and werenegative at this time or not returned at the time of this note. RADIOLOGY:   Non-plain film images such as CT, Ultrasound and MRI are read by the radiologist. Charlie Schulte PA-C have directly visualized the radiologic plain film image(s) with the below findings:        Interpretation per the Radiologist below, if available at the time of thisnote:    CT Cervical Spine WO Contrast   Final Result   No evidence of fracture. Straightening of the cervical spine may be positional related to muscle spasm   or strain. Atypical sclerotic lesion of the left-sided skull base. Separate small   sclerotic lesion within the C4 vertebral body. If the patient has a history   of cancer, osteoblastic metastatic disease would be of concern. This could   be further evaluated with bone scan. XR HIP 2-3 VW W PELVIS RIGHT   Final Result   1. No acute traumatic injury involving the pelvis, right hip, or right knee. 2. Given the degree of osteopenia, nondisplaced fractures may be   radiographically occult.   If pain or concern for hip fracture persists,   consider MR imaging. XR KNEE RIGHT (1-2 VIEWS)   Final Result   1. No acute traumatic injury involving the pelvis, right hip, or right knee. 2. Given the degree of osteopenia, nondisplaced fractures may be   radiographically occult. If pain or concern for hip fracture persists,   consider MR imaging. CT Head WO Contrast   Final Result   No acute intracranial abnormality. Mild generalized cerebral atrophy and chronic small vessel white matter   ischemic changes. XR CHEST PORTABLE   Final Result   1. Stable mild cardiomegaly. 2. No acute focal airspace consolidation. 3. Bandlike atelectasis or scarring at the left base, stable. No results found. MEDICAL DECISION MAKING / ED COURSE:      PROCEDURES:   Procedures    None    Patient was given:  Medications - No data to display      Emergency room course: Patient on exam pupils are equal round and reactive to light S\" was intact. Scalp show no hematoma. She does have some ecchymosis/hematoma around the right orbit. Small superficial laceration to the medial aspect of the right orbit. . She'll no show no tenderness or swelling. Throat was clear neck supple full range of motion without tenderness. No midline cervicothoracic lumbar spine. Cardiorespiratory regular rhythm, lungs clear no wheezes rales or rhonchi. No chest wall tenderness. Abdomen soft nontender. No rebound or guarding noted. Normal bowel sounds all 4 quadrants. Right hip has mild tenderness with palpation she does have full range of motion able to raise her leg off the bed. No pain with internal/external rotation. Just with palpation. No bruising or ecchymosis noted no obvious deformity. Right knee shows mild tenderness with palpation no swelling noted. She does have full flexion extension out crepitus. Full range of motion all extremity. Upper and lower extremities show no tremors.  She has no facial drooping on source patient

## 2018-12-14 NOTE — CARE COORDINATION
[address] [id number] Continence Nurse  Consult Note       NAME:  Mary RECORD NUMBER:  [id number]  AGE: 77 y.o. GENDER: female  : 1940  TODAY'S DATE:  2018    Subjective   Reason for WOCN Evaluation and Assessment: to evaluate and treat \"Right eye\"       Ochoa Espana is a 77 y.o. female referred by:   [] Physician  [x] Nursing  [] Other:     Wound Identification:  Wound Type: traumatic and scalp multiple skin grafts   Contributing Factors: chronic pressure, decreased mobility, shear force and frequent falls     Wound History: admitted with recurrent falls and generalized weakness from home. Pt fell recently hitting right eye and elbow. Pt active with home care and is follow in [facility] where she has been getting wound care and had multiple skin grafts placed to scalp. Pt unsure of wound care treatment, but knows that the home care nurse cleanses the scalp wound daily with Vashe Solution and applies Hydrogel left uncovered daily.      Patient Goal of Care:  [x] Wound Healing  [] Odor Control  [] Palliative Care  [] Pain Control   [] Other:         PAST MEDICAL HISTORY        Diagnosis Date    Cancer ([address]     High blood pressure     Kidney trouble     [facility]     Shingles        PAST SURGICAL HISTORY    Past Surgical History:   Procedure Laterality Date    BREAST SURGERY      HYSTERECTOMY      SHOULDER SURGERY      WRIST FRACTURE SURGERY Left 2017    ORIF left distal radius       FAMILY HISTORY    Family History   Problem Relation Age of Onset    Cancer Mother     Stroke Mother     Cancer Father     High Blood Pressure Father        SOCIAL HISTORY    Social History   Substance Use Topics    Smoking status: Never Smoker    Smokeless tobacco: Never Used    Alcohol use No       ALLERGIES    Allergies   Allergen Reactions    Sulfa Antibiotics        MEDICATIONS    No current facility-administered medications on file

## 2018-12-14 NOTE — PROGRESS NOTES
Patient alert and oriented. Family at bedside. Oriented to room. Pillows to both lower extremities. Patient assessment completed. Multiple bruises noted. Patient has a large purplish/blue area to right hip, right eye bruised (patient states that she has in home wound care) and trace bruises that she states are from hitting her arms or hands in certain places. Medications administered per MD orders. Patient living will copied, placed in chart. Call light left within reach. Monitor patient progress.

## 2018-12-14 NOTE — ED NOTES
Bed: B-06  Expected date:   Expected time:   Means of arrival: [facility]  Comments:  78F multiple falls     Mathew David RN  12/14/18 [id number]

## 2018-12-15 LAB
ANION GAP SERPL CALCULATED.3IONS-SCNC: 11 MMOL/L (ref 3–16)
BASOPHILS ABSOLUTE: 0 K/UL (ref 0–0.2)
BASOPHILS RELATIVE PERCENT: 0 %
BILIRUBIN URINE: NEGATIVE
BLOOD, URINE: NEGATIVE
BUN BLDV-MCNC: 58 MG/DL (ref 7–20)
CALCIUM SERPL-MCNC: 9.1 MG/DL (ref 8.3–10.6)
CHLORIDE BLD-SCNC: 103 MMOL/L (ref 99–110)
CLARITY: CLEAR
CO2: 30 MMOL/L (ref 21–32)
COLOR: YELLOW
CREAT SERPL-MCNC: 2.2 MG/DL (ref 0.6–1.2)
EKG ATRIAL RATE: 61 BPM
EKG DIAGNOSIS: NORMAL
EKG P-R INTERVAL: 192 MS
EKG Q-T INTERVAL: 438 MS
EKG QRS DURATION: 136 MS
EKG QTC CALCULATION (BAZETT): 440 MS
EKG R AXIS: -59 DEGREES
EKG T AXIS: 48 DEGREES
EKG VENTRICULAR RATE: 61 BPM
EOSINOPHILS ABSOLUTE: 0 K/UL (ref 0–0.6)
EOSINOPHILS RELATIVE PERCENT: 0 %
EPITHELIAL CELLS, UA: 0 /HPF (ref 0–5)
GFR AFRICAN AMERICAN: 26
GFR NON-AFRICAN AMERICAN: 21
GLUCOSE BLD-MCNC: 95 MG/DL (ref 70–99)
GLUCOSE URINE: NEGATIVE MG/DL
HCT VFR BLD CALC: 28.1 % (ref 36–48)
HEMOGLOBIN: 9.1 G/DL (ref 12–16)
HYALINE CASTS: 1 /LPF (ref 0–8)
KETONES, URINE: NEGATIVE MG/DL
LEUKOCYTE ESTERASE, URINE: NEGATIVE
LV EF: 63 %
LVEF MODALITY: NORMAL
LYMPHOCYTES ABSOLUTE: 0.7 K/UL (ref 1–5.1)
LYMPHOCYTES RELATIVE PERCENT: 7.6 %
MCH RBC QN AUTO: 31 PG (ref 26–34)
MCHC RBC AUTO-ENTMCNC: 32.5 G/DL (ref 31–36)
MCV RBC AUTO: 95.6 FL (ref 80–100)
MICROSCOPIC EXAMINATION: YES
MONOCYTES ABSOLUTE: 0.4 K/UL (ref 0–1.3)
MONOCYTES RELATIVE PERCENT: 4.8 %
NEUTROPHILS ABSOLUTE: 7.9 K/UL (ref 1.7–7.7)
NEUTROPHILS RELATIVE PERCENT: 87.6 %
NITRITE, URINE: NEGATIVE
PDW BLD-RTO: 14.3 % (ref 12.4–15.4)
PH UA: 7.5
PLATELET # BLD: 119 K/UL (ref 135–450)
PMV BLD AUTO: 9.8 FL (ref 5–10.5)
POTASSIUM SERPL-SCNC: 4.3 MMOL/L (ref 3.5–5.1)
PROTEIN UA: 100 MG/DL
RBC # BLD: 2.94 M/UL (ref 4–5.2)
RBC UA: 1 /HPF (ref 0–4)
SODIUM BLD-SCNC: 144 MMOL/L (ref 136–145)
SPECIFIC GRAVITY UA: 1.01
TROPONIN: 0.1 NG/ML
TROPONIN: 0.11 NG/ML
URINE REFLEX TO CULTURE: ABNORMAL
URINE TYPE: ABNORMAL
UROBILINOGEN, URINE: 0.2 E.U./DL
WBC # BLD: 9 K/UL (ref 4–11)
WBC UA: 3 /HPF (ref 0–5)

## 2018-12-15 PROCEDURE — 93306 TTE W/DOPPLER COMPLETE: CPT

## 2018-12-15 PROCEDURE — 84484 ASSAY OF TROPONIN QUANT: CPT

## 2018-12-15 PROCEDURE — 2580000003 HC RX 258: Performed by: INTERNAL MEDICINE

## 2018-12-15 PROCEDURE — 85025 COMPLETE CBC W/AUTO DIFF WBC: CPT

## 2018-12-15 PROCEDURE — 93010 ELECTROCARDIOGRAM REPORT: CPT | Performed by: INTERNAL MEDICINE

## 2018-12-15 PROCEDURE — 6370000000 HC RX 637 (ALT 250 FOR IP): Performed by: INTERNAL MEDICINE

## 2018-12-15 PROCEDURE — 97116 GAIT TRAINING THERAPY: CPT

## 2018-12-15 PROCEDURE — G8987 SELF CARE CURRENT STATUS: HCPCS

## 2018-12-15 PROCEDURE — 81001 URINALYSIS AUTO W/SCOPE: CPT

## 2018-12-15 PROCEDURE — 80048 BASIC METABOLIC PNL TOTAL CA: CPT

## 2018-12-15 PROCEDURE — 94760 N-INVAS EAR/PLS OXIMETRY 1: CPT

## 2018-12-15 PROCEDURE — G8988 SELF CARE GOAL STATUS: HCPCS

## 2018-12-15 PROCEDURE — 97535 SELF CARE MNGMENT TRAINING: CPT

## 2018-12-15 PROCEDURE — 97162 PT EVAL MOD COMPLEX 30 MIN: CPT

## 2018-12-15 PROCEDURE — 36415 COLL VENOUS BLD VENIPUNCTURE: CPT

## 2018-12-15 PROCEDURE — 97530 THERAPEUTIC ACTIVITIES: CPT

## 2018-12-15 PROCEDURE — 96372 THER/PROPH/DIAG INJ SC/IM: CPT

## 2018-12-15 PROCEDURE — 97166 OT EVAL MOD COMPLEX 45 MIN: CPT

## 2018-12-15 PROCEDURE — 2060000000 HC ICU INTERMEDIATE R&B

## 2018-12-15 PROCEDURE — G8979 MOBILITY GOAL STATUS: HCPCS

## 2018-12-15 PROCEDURE — G8978 MOBILITY CURRENT STATUS: HCPCS

## 2018-12-15 PROCEDURE — 6360000002 HC RX W HCPCS: Performed by: INTERNAL MEDICINE

## 2018-12-15 PROCEDURE — 99232 SBSQ HOSP IP/OBS MODERATE 35: CPT | Performed by: INTERNAL MEDICINE

## 2018-12-15 RX ORDER — AMLODIPINE BESYLATE 10 MG/1
10 TABLET ORAL DAILY
Status: DISCONTINUED | OUTPATIENT
Start: 2018-12-15 | End: 2018-12-21 | Stop reason: HOSPADM

## 2018-12-15 RX ORDER — HYDROCHLOROTHIAZIDE 25 MG/1
25 TABLET ORAL DAILY
Status: DISCONTINUED | OUTPATIENT
Start: 2018-12-15 | End: 2018-12-16

## 2018-12-15 RX ORDER — ALENDRONATE SODIUM 70 MG/1
70 TABLET ORAL
COMMUNITY

## 2018-12-15 RX ADMIN — ENOXAPARIN SODIUM 30 MG: 30 INJECTION SUBCUTANEOUS at 08:37

## 2018-12-15 RX ADMIN — CITALOPRAM HYDROBROMIDE 10 MG: 10 TABLET ORAL at 08:22

## 2018-12-15 RX ADMIN — ATORVASTATIN CALCIUM 10 MG: 10 TABLET, FILM COATED ORAL at 08:23

## 2018-12-15 RX ADMIN — HYDROCHLOROTHIAZIDE 25 MG: 25 TABLET ORAL at 16:00

## 2018-12-15 RX ADMIN — FUROSEMIDE 10 MG: 20 TABLET ORAL at 08:23

## 2018-12-15 RX ADMIN — Medication 10 ML: at 16:04

## 2018-12-15 RX ADMIN — VITAMIN D, TAB 1000IU (100/BT) 2000 UNITS: 25 TAB at 08:22

## 2018-12-15 RX ADMIN — AMLODIPINE BESYLATE 10 MG: 10 TABLET ORAL at 16:00

## 2018-12-15 RX ADMIN — ASPIRIN 81 MG 81 MG: 81 TABLET ORAL at 08:22

## 2018-12-15 RX ADMIN — CALCIUM CARBONATE-CHOLECALCIFEROL TAB 250 MG-125 UNIT 500 MG: 250-125 TAB at 08:22

## 2018-12-15 RX ADMIN — OXYCODONE HYDROCHLORIDE AND ACETAMINOPHEN 500 MG: 500 TABLET ORAL at 08:22

## 2018-12-15 RX ADMIN — CALCITRIOL 0.25 MCG: 0.25 CAPSULE ORAL at 08:22

## 2018-12-15 RX ADMIN — PREDNISONE 10 MG: 10 TABLET ORAL at 08:23

## 2018-12-15 RX ADMIN — LEVOTHYROXINE SODIUM 25 MCG: 25 TABLET ORAL at 06:10

## 2018-12-15 RX ADMIN — Medication 10 ML: at 20:19

## 2018-12-15 RX ADMIN — FERROUS SULFATE TAB EC 324 MG (65 MG FE EQUIVALENT) 324 MG: 324 (65 FE) TABLET DELAYED RESPONSE at 08:23

## 2018-12-15 ASSESSMENT — PAIN DESCRIPTION - PAIN TYPE
TYPE: CHRONIC PAIN
TYPE: CHRONIC PAIN

## 2018-12-15 ASSESSMENT — PAIN DESCRIPTION - LOCATION
LOCATION: HEAD;NECK
LOCATION: HEAD;NECK

## 2018-12-15 NOTE — PROGRESS NOTES
Hospital Medicine Progress Note      Admit Date: 12/14/2018         Overnight Events: none    CC: F/U for Fall and bradycardia    HPI:   65-year-old female who presented to the ED with complaints of generalized weakness. She sustained a fall last evening in which she was found on the floor by the nurse that comes to check on her. Patient was then able to get herself up to answer the door thereafter. This is a history of cardiomyopathy Alayne Sow will however that was resolved on her last echocardiogram. Cardiology has been consulted secondary to noted bradycardia. Pharmacy mentions that she has not had metoprolol filled in several months. She continues to have dizziness with falling. She's also noted to have an ongoing elevated troponins with no chest pain. Interval History/Subjective:   No events overnight. She continues to complain of dizziness with standing and walking. Denies fevers, chills, chest pain, shortness breath, nausea, vomiting or abdominal pain at present. Review of Systems:   Comprehensive ROS negative except as mentioned above. Past Medical History:        Diagnosis Date    Cancer ([facility] [address]     High blood pressure     Kidney trouble     Seizure ([facility] [address]     Shingles        Past Surgical History:        Procedure Laterality Date    BREAST SURGERY      HYSTERECTOMY      SHOULDER SURGERY      WRIST FRACTURE SURGERY Left 08/21/2017    ORIF left distal radius       Allergies:  Sulfa antibiotics    Past medical and surgical history reviewed. Any changes have been noted.      Scheduled and prn Medications:    Scheduled Meds:   vitamin C  500 mg Oral Daily    aspirin  81 mg Oral Daily    calcitRIOL  0.25 mcg Oral Daily    oyster shell calcium/vitamin D  2 tablet Oral Daily    vitamin D  2,000 Units Oral Daily    citalopram  10 mg Oral Daily    ferrous sulfate  325 mg Oral Daily with breakfast    furosemide  10 mg Oral Daily    levothyroxine  25 mcg Oral Daily    predniSONE  10 mg Component Value Date    ALT 10 01/14/2016    AST 18 01/14/2016    ALKPHOS 80 01/14/2016    BILITOT <0.2 01/14/2016        No flowsheet data found. Imaging:  CT Cervical Spine WO Contrast   Final Result   No evidence of fracture. Straightening of the cervical spine may be positional related to muscle spasm   or strain. Atypical sclerotic lesion of the left-sided skull base. Separate small   sclerotic lesion within the C4 vertebral body. If the patient has a history   of cancer, osteoblastic metastatic disease would be of concern. This could   be further evaluated with bone scan. XR HIP 2-3 VW W PELVIS RIGHT   Final Result   1. No acute traumatic injury involving the pelvis, right hip, or right knee. 2. Given the degree of osteopenia, nondisplaced fractures may be   radiographically occult. If pain or concern for hip fracture persists,   consider MR imaging. XR KNEE RIGHT (1-2 VIEWS)   Final Result   1. No acute traumatic injury involving the pelvis, right hip, or right knee. 2. Given the degree of osteopenia, nondisplaced fractures may be   radiographically occult. If pain or concern for hip fracture persists,   consider MR imaging. CT Head WO Contrast   Final Result   No acute intracranial abnormality. Mild generalized cerebral atrophy and chronic small vessel white matter   ischemic changes. XR CHEST PORTABLE   Final Result   1. Stable mild cardiomegaly. 2. No acute focal airspace consolidation. 3. Bandlike atelectasis or scarring at the left base, stable. Assessment & Plan:      Dizziness  -  thought to be related to bradycardia   - Cardiology consulted , appreciate all further recommendations   - Patient is not taking metoprolol and this has been moved off her medication list   - Amlodipine was discontinued in July.    - Echo performed and pending  - monitor on telemetry  - check orthostatic blood pressures  - Cardiology possibly considering pacemaker placement. Nonzero troponin  - trending down  - Cardiology following  - Acute chest pain    Essential hypertension  - monitor blood pressure as she's had several medications discontinued over the past few months. Mixed hyperlipidemia  - on statin and will continue here    CKD stage 3  -  Creatinine stable  - avoid nephrotoxins  - Likely with secondary hyperparathyroidism continue calcitriol    CAD  - Last Was in 2016 with 50% lesion noted in her LAD  - on asa, statin     Hypothyroidism, unspecified  - Continue levothyroxine      Body mass index is 26.26 kg/m². The patient and / or the family were informed of the results of any tests, a time was given to answer questions, a plan was proposed and they agreed with plan.     DVT prophylaxis: [x] Lovenox  [] SQ Heparin  [] SCDs because of  [] warfarin/oral direct thrombin inhibitor [] Encourage ambulation    GI prophylaxis: [] PPI/T3tezkpks  [x] not indicated    Probiotic if on abx: [] Yes [] No [x] Not Indicated    Diet: DIET CARDIAC;    Consults:  IP CONSULT TO SOCIAL WORK  IP CONSULT TO CARDIOLOGY  IP CONSULT TO SOCIAL WORK    Disposition:  inpatient    Code Status: Full Code    ELOS: 1-3 days TBD      Luis Felipe Fritz, APRN - CNP  12/15/18

## 2018-12-15 NOTE — PROGRESS NOTES
falls, head contusion, elevated troponin, bradycardia and R eye and elbow wounds  Exam: ADLs, transfers  Assistance / Modification: assist, cues, walker  REQUIRES OT FOLLOW UP: Yes  Activity Tolerance  Activity Tolerance: Patient Tolerated treatment well  Activity Tolerance: Nursing requested HR monitoring during therapy. Pt's HR was 63 at rest and up to 82 with activity. Safety Devices  Safety Devices in place: Yes  Type of devices: Left in chair;Nurse notified; Chair alarm in place;Call light within reach         Plan   Plan  Times per week: 3-5x  Current Treatment Recommendations: Functional Mobility Training, Patient/Caregiver Education & Training, Equipment Evaluation, Education, & procurement, Self-Care / ADL    G-Code  OT G-codes  Functional Limitation: Self care  Self Care Current Status (): At least 40 percent but less than 60 percent impaired, limited or restricted  Self Care Goal Status (): At least 20 percent but less than 40 percent impaired, limited or restricted  OutComes Score    Vika Alejandra scored a 18/24 on the AM-[facility] ADL Inpatient form. Current research shows that an AM-PAC score of 17 or less is typically not associated with a discharge to the patient's home setting. Based on the patients AM-PAC score and their current ADL deficits, it is recommended that the patient have 3-5 sessions per week of Occupational Therapy at d/c to increase the patients independence.                                           AM-PAC Score        AM-PAC Inpatient Daily Activity Raw Score: 18  AM-PAC Inpatient ADL T-Scale Score : 38.66  ADL Inpatient CMS 0-100% Score: 46.65  ADL Inpatient CMS G-Code Modifier : CK    Goals  Short term goals  Time Frame for Short term goals: at d/c:  Short term goal 1: Supervision for grooming  Short term goal 2: SBA for bathing and dressing  Short term goal 3: Modified independent bed mobility  Short term goal 4: Supervision for transfers  Patient Goals   Patient goals : To be able to go up/ down steps       Therapy Time   Individual Concurrent Group Co-treatment   Time In 0955         Time Out 1052         Minutes 57               Patient Education: Call for needs and for assist to get up.     Merlin De Leon

## 2018-12-15 NOTE — PROGRESS NOTES
Requires cues for some  Sequencing: Requires cues for all    Objective  AROM RLE (degrees)  RLE AROM: WFL  AROM LLE (degrees)  LLE AROM : WFL  Strength RLE  Comment: functionally fair  Strength LLE  Comment: functionally fair     Sensation  Overall Sensation Status: Impaired  Additional Comments: She reports B numbness in feet at times  Bed mobility  Supine to Sit: Stand by assistance (HOB up and used rail)  Sit to Supine: Unable to assess (pt up to the chair at end of session)  Transfers  Sit to Stand: Contact guard assistance (from chair, EOB and commode)  Stand to sit: Contact guard assistance (cues for hand placement)  Ambulation  Ambulation?: Yes  Ambulation 1  Surface: level tile  Device: Rolling Walker  Assistance: Contact guard assistance  Quality of Gait: flexed posture, guarded and slow pace, decreased B step height  Distance: 10 feet x 1; 25 feet x 1  Comments: HR variable 63 to 82 thru-out session  Stairs/Curb  Stairs?: No     Balance  Posture: Fair  Sitting - Static: Good  Sitting - Dynamic: Good  Standing - Static: Good;-  Standing - Dynamic: Fair (with walker)        Assessment   Assessment: The pt is a 78 yo female admitted from home after having 2 falls in 1 day. The pt reports she lives alone with the asisstance of her gr-son and his wife. She uses a QC on the stairs and when out but more \"furniture\" walks in the home. The pt completes self-care on her own and gets assist for homemaking. The pt reports she still drives. Today, the pt needed CGA fro transfers and CGA for ambulation in the room setting with a walker. The pt is guarded when walking and could not have gone much further today due to weakness. The pt appears to be functioning below her reported baseline and would benefit from con't moderate paced skilled PT services at d/c prior to going home. The pt at this time is only interested in home PT. Will con't to follow with the pt and monitor her progress.    Treatment Diagnosis: weakness Treatment Minutes: 44 Minutes       Electronically signed by Allison Lin, PT [id number] on 12/15/2018 at 10:55 AM

## 2018-12-15 NOTE — CONSULTS
No organomegaly  Extremities: No Cyanosis or Clubbing; Edema none  Neurological: Oriented to time, place, and person, Non-anxious  Psychiatric: Normal mood and affect  Skin: Warm and dry,  No rash seen      Current Facility-Administered Medications: vitamin C (ASCORBIC ACID) tablet 500 mg, 500 mg, Oral, Daily  aspirin chewable tablet 81 mg, 81 mg, Oral, Daily  calcitRIOL (ROCALTROL) capsule 0.25 mcg, 0.25 mcg, Oral, Daily  oyster shell calcium/vitamin D 250-125 MG-UNIT per tablet 500 mg, 2 tablet, Oral, Daily  vitamin D (CHOLECALCIFEROL) tablet 2,000 Units, 2,000 Units, Oral, Daily  citalopram (CELEXA) tablet 10 mg, 10 mg, Oral, Daily  ferrous sulfate EC tablet 325 mg, 325 mg, Oral, Daily with breakfast  furosemide (LASIX) tablet 10 mg, 10 mg, Oral, Daily  levothyroxine (SYNTHROID) tablet 25 mcg, 25 mcg, Oral, Daily  predniSONE (DELTASONE) tablet 10 mg, 10 mg, Oral, Daily  promethazine (PHENERGAN) tablet 25 mg, 25 mg, Oral, Q6H PRN  sodium chloride flush 0.9 % injection 10 mL, 10 mL, Intravenous, 2 times per day  sodium chloride flush 0.9 % injection 10 mL, 10 mL, Intravenous, PRN  magnesium hydroxide (MILK OF MAGNESIA) 400 MG/5ML suspension 30 mL, 30 mL, Oral, Daily PRN  ondansetron (ZOFRAN) injection 4 mg, 4 mg, Intravenous, Q6H PRN  enoxaparin (LOVENOX) injection 30 mg, 30 mg, Subcutaneous, Daily  pneumococcal polyvalent (PNEUMOVAX 23) inj 0.5 mL, 0.5 mL, Intramuscular, Once  acetaminophen (TYLENOL) tablet 650 mg, 650 mg, Oral, Q4H PRN  atorvastatin (LIPITOR) tablet 10 mg, 10 mg, Oral, Daily      Labs:   Recent Labs      12/14/18   1128  12/15/18   0703   WBC  13.8*  9.0   HGB  10.0*  9.1*   HCT  30.3*  28.1*   PLT  133*  119*     Recent Labs      12/14/18   1128  12/15/18   0702   NA  144  144   K  3.5  4.3   CO2  32  30   BUN  58*  58*   CREATININE  2.0*  2.2*   GLUCOSE  77  95       Recent Labs      12/14/18   1128  12/14/18   1800  12/15/18   0025  12/15/18   0702   CKTOTAL  126   --    --    --    Breonna Reardon 0.11*  0.12*  0.11*  0.10*     Lab Results   Component Value Date    HDL 69 01/15/2016    HDL 45 2012    LDLCALC 51 01/15/2016    TRIG 87 01/15/2016     No results for input(s): AST, ALT, LABALBU in the last 72 hours. EK2018  unclear rhythm but suspect sinus bradycardia (see rhythm strip V1)Right bundle branch blockVoltage criteria for left ventricular hypertrophyAbnormal ECGWhen compared with ECG of 2016 04:26,Right bundle branch block is now PresentConfirmed    Chest X-Ray:  No major disease    ECHO: 2016 TC  - Left ventricle: The cavity size was normal. Wall thickness was   normal. Systolic function was normal. The calculated ejection   fraction was in the range of 55% to 60%. Wall motion was normal;    there were no regional wall motion abnormalities. The study is   not technically sufficient to allow evaluation of LV diastolic   function. Myocardial performance and contractility assessed using    2D global longitudinal strain imaging. The global longitudinal Berton Axe was -18%. - Inferior vena cava: The vessel was normal in size; the    respirophasic diameter changes were in the normal range (>= 50%);    findings are consistent with normal central venous pressure. [facility] angiogram : 2016.     1. Left main divides into an LAD and a circumflex. The left main has ostial narrowing in the 10% to 20% range. Otherwise it is free of disease. 2.  The LAD has moderate calcification. It gives rise to 1 very large 1st diagonal.  The 2nd diagonal is relatively small. The rest of the vessel is free of disease. 3.  The circumflex artery has no major obstructive disease. 4.  The RCA in the midsegment at the branch point of the RV branch has 50% lesion with some haziness. The rest of the vessel has no obstructive disease. CONCLUSIONS:    1.  A 50% mid LAD lesion with some haziness. 2.  Calcified arteries with no major obstructive disease in the LAD and the circumflex.   3.  Apical

## 2018-12-15 NOTE — PROGRESS NOTES
4 Eyes Skin Assessment     The patient is being assess for  Admission    I agree that 2 RN's have performed a thorough Head to Toe Skin Assessment on the patient. ALL assessment sites listed below have been assessed. Areas assessed by both nurses:   [x]   Head, Face, and Ears   [x]   Shoulders, Back, and Chest  [x]   Arms, Elbows, and Hands   [x]   Coccyx, Sacrum, and IschIum  [x]   Legs, Feet, and Heels        Does the Patient have Skin Breakdown?   No         [place] Prevention initiated:  Yes   Wound Care Orders initiated:  Yes      [id number]  nurse consulted for Pressure Injury (Stage 3,4, Unstageable, DTI, NWPT, and Complex wounds), New and Established Ostomies:  Yes      Nurse 1 eSignature: Electronically signed by Maura Leon RN on 12/14/18 at 7:40 PM    **SHARE this note so that the co-signing nurse is able to place an eSignature**    Nurse 2 eSignature: Electronically signed by Kelly Curry RN on 12/14/2018 at 11:59 PM

## 2018-12-16 LAB
ALBUMIN SERPL-MCNC: 3.3 G/DL (ref 3.4–5)
ANION GAP SERPL CALCULATED.3IONS-SCNC: 13 MMOL/L (ref 3–16)
BUN BLDV-MCNC: 59 MG/DL (ref 7–20)
CALCIUM SERPL-MCNC: 9.4 MG/DL (ref 8.3–10.6)
CHLORIDE BLD-SCNC: 104 MMOL/L (ref 99–110)
CO2: 29 MMOL/L (ref 21–32)
CREAT SERPL-MCNC: 2.1 MG/DL (ref 0.6–1.2)
FOLATE: >20 NG/ML (ref 4.78–24.2)
GFR AFRICAN AMERICAN: 27
GFR NON-AFRICAN AMERICAN: 23
GLUCOSE BLD-MCNC: 76 MG/DL (ref 70–99)
HCT VFR BLD CALC: 26.7 % (ref 36–48)
HEMOGLOBIN: 8.9 G/DL (ref 12–16)
MCH RBC QN AUTO: 31.6 PG (ref 26–34)
MCHC RBC AUTO-ENTMCNC: 33.3 G/DL (ref 31–36)
MCV RBC AUTO: 94.9 FL (ref 80–100)
PDW BLD-RTO: 14.3 % (ref 12.4–15.4)
PHOSPHORUS: 3.4 MG/DL (ref 2.5–4.9)
PLATELET # BLD: 126 K/UL (ref 135–450)
PMV BLD AUTO: 9.7 FL (ref 5–10.5)
POTASSIUM SERPL-SCNC: 3.9 MMOL/L (ref 3.5–5.1)
RBC # BLD: 2.81 M/UL (ref 4–5.2)
SODIUM BLD-SCNC: 146 MMOL/L (ref 136–145)
VITAMIN B-12: 388 PG/ML (ref 211–911)
WBC # BLD: 10 K/UL (ref 4–11)

## 2018-12-16 PROCEDURE — 6360000002 HC RX W HCPCS: Performed by: INTERNAL MEDICINE

## 2018-12-16 PROCEDURE — 82607 VITAMIN B-12: CPT

## 2018-12-16 PROCEDURE — 6370000000 HC RX 637 (ALT 250 FOR IP): Performed by: INTERNAL MEDICINE

## 2018-12-16 PROCEDURE — 36415 COLL VENOUS BLD VENIPUNCTURE: CPT

## 2018-12-16 PROCEDURE — 6370000000 HC RX 637 (ALT 250 FOR IP): Performed by: NURSE PRACTITIONER

## 2018-12-16 PROCEDURE — 96372 THER/PROPH/DIAG INJ SC/IM: CPT

## 2018-12-16 PROCEDURE — 80069 RENAL FUNCTION PANEL: CPT

## 2018-12-16 PROCEDURE — 99233 SBSQ HOSP IP/OBS HIGH 50: CPT | Performed by: INTERNAL MEDICINE

## 2018-12-16 PROCEDURE — 2580000003 HC RX 258: Performed by: INTERNAL MEDICINE

## 2018-12-16 PROCEDURE — 82746 ASSAY OF FOLIC ACID SERUM: CPT

## 2018-12-16 PROCEDURE — 2060000000 HC ICU INTERMEDIATE R&B

## 2018-12-16 PROCEDURE — 94760 N-INVAS EAR/PLS OXIMETRY 1: CPT

## 2018-12-16 PROCEDURE — 85027 COMPLETE CBC AUTOMATED: CPT

## 2018-12-16 RX ORDER — MECLIZINE HCL 12.5 MG/1
12.5 TABLET ORAL 3 TIMES DAILY PRN
Status: DISCONTINUED | OUTPATIENT
Start: 2018-12-16 | End: 2018-12-21 | Stop reason: HOSPADM

## 2018-12-16 RX ORDER — HYDRALAZINE HYDROCHLORIDE 25 MG/1
25 TABLET, FILM COATED ORAL EVERY 12 HOURS SCHEDULED
Status: DISCONTINUED | OUTPATIENT
Start: 2018-12-16 | End: 2018-12-17

## 2018-12-16 RX ADMIN — CALCIUM CARBONATE-CHOLECALCIFEROL TAB 250 MG-125 UNIT 500 MG: 250-125 TAB at 08:31

## 2018-12-16 RX ADMIN — Medication 10 ML: at 08:32

## 2018-12-16 RX ADMIN — LEVOTHYROXINE SODIUM 25 MCG: 25 TABLET ORAL at 06:45

## 2018-12-16 RX ADMIN — FERROUS SULFATE TAB EC 324 MG (65 MG FE EQUIVALENT) 324 MG: 324 (65 FE) TABLET DELAYED RESPONSE at 08:31

## 2018-12-16 RX ADMIN — ATORVASTATIN CALCIUM 10 MG: 10 TABLET, FILM COATED ORAL at 08:30

## 2018-12-16 RX ADMIN — AMLODIPINE BESYLATE 10 MG: 10 TABLET ORAL at 08:31

## 2018-12-16 RX ADMIN — FUROSEMIDE 10 MG: 20 TABLET ORAL at 08:30

## 2018-12-16 RX ADMIN — PREDNISONE 10 MG: 10 TABLET ORAL at 08:31

## 2018-12-16 RX ADMIN — HYDRALAZINE HYDROCHLORIDE 25 MG: 25 TABLET, FILM COATED ORAL at 15:09

## 2018-12-16 RX ADMIN — CALCITRIOL 0.25 MCG: 0.25 CAPSULE ORAL at 08:30

## 2018-12-16 RX ADMIN — OXYCODONE HYDROCHLORIDE AND ACETAMINOPHEN 500 MG: 500 TABLET ORAL at 08:31

## 2018-12-16 RX ADMIN — ENOXAPARIN SODIUM 30 MG: 30 INJECTION SUBCUTANEOUS at 08:31

## 2018-12-16 RX ADMIN — CITALOPRAM HYDROBROMIDE 10 MG: 10 TABLET ORAL at 08:30

## 2018-12-16 RX ADMIN — ASPIRIN 81 MG 81 MG: 81 TABLET ORAL at 08:31

## 2018-12-16 RX ADMIN — HYDROCHLOROTHIAZIDE 25 MG: 25 TABLET ORAL at 08:31

## 2018-12-16 RX ADMIN — MECLIZINE 12.5 MG: 12.5 TABLET ORAL at 10:58

## 2018-12-16 RX ADMIN — VITAMIN D, TAB 1000IU (100/BT) 2000 UNITS: 25 TAB at 08:31

## 2018-12-16 NOTE — PROGRESS NOTES
Patient in bed, LENORE, talkative. Concerned about SNF placement - wants to go home with therapy. There is a message out toDr. Bell Walsh, concerning elevated BPS from day shift and this RN messaged  NP- who stated to continue to monitor.

## 2018-12-16 NOTE — PROGRESS NOTES
S2, Palpation:  Nl PMI;  No heaves or thrills, JVP:  normal  Abdomen: Soft, non-tender, Normal bowel sounds,  No organomegaly  Extremities: No Cyanosis or Clubbing; Edema none  Neurological: Oriented to time, place, and person, Non-anxious  Psychiatric: Normal mood and affect  Skin: Warm and dry,  No rash seen      Current Facility-Administered Medications: perflutren lipid microspheres (DEFINITY) injection 1.65 mg, 1.5 mL, Intravenous, ONCE PRN  amLODIPine (NORVASC) tablet 10 mg, 10 mg, Oral, Daily  vitamin C (ASCORBIC ACID) tablet 500 mg, 500 mg, Oral, Daily  aspirin chewable tablet 81 mg, 81 mg, Oral, Daily  calcitRIOL (ROCALTROL) capsule 0.25 mcg, 0.25 mcg, Oral, Daily  oyster shell calcium/vitamin D 250-125 MG-UNIT per tablet 500 mg, 2 tablet, Oral, Daily  vitamin D (CHOLECALCIFEROL) tablet 2,000 Units, 2,000 Units, Oral, Daily  citalopram (CELEXA) tablet 10 mg, 10 mg, Oral, Daily  ferrous sulfate EC tablet 325 mg, 325 mg, Oral, Daily with breakfast  furosemide (LASIX) tablet 10 mg, 10 mg, Oral, Daily  levothyroxine (SYNTHROID) tablet 25 mcg, 25 mcg, Oral, Daily  predniSONE (DELTASONE) tablet 10 mg, 10 mg, Oral, Daily  promethazine (PHENERGAN) tablet 25 mg, 25 mg, Oral, Q6H PRN  sodium chloride flush 0.9 % injection 10 mL, 10 mL, Intravenous, 2 times per day  sodium chloride flush 0.9 % injection 10 mL, 10 mL, Intravenous, PRN  magnesium hydroxide (MILK OF MAGNESIA) 400 MG/5ML suspension 30 mL, 30 mL, Oral, Daily PRN  ondansetron (ZOFRAN) injection 4 mg, 4 mg, Intravenous, Q6H PRN  enoxaparin (LOVENOX) injection 30 mg, 30 mg, Subcutaneous, Daily  pneumococcal polyvalent (PNEUMOVAX 23) inj 0.5 mL, 0.5 mL, Intramuscular, Once  acetaminophen (TYLENOL) tablet 650 mg, 650 mg, Oral, Q4H PRN  atorvastatin (LIPITOR) tablet 10 mg, 10 mg, Oral, Daily      Labs:   Recent Labs      12/15/18   0703  12/16/18   0806   WBC  9.0  10.0   HGB  9.1*  8.9*   HCT  28.1*  26.7*   PLT  119*  126*     Recent Labs      12/15/18   0702

## 2018-12-16 NOTE — PROGRESS NOTES
Hospital Medicine Progress Note      Admit Date: 12/14/2018         Overnight Events: none    CC: F/U for Fall and bradycardia    HPI:   79-year-old female who presented to the ED with complaints of generalized weakness who also sustained a fall. Has ongoing h/o dizziness/lightheaded that has been present for several years. She has a h/o cardiomyopathy Takasubo,  that was resolved on her last echocardiogram. Cardiology has been consulted secondary to noted bradycardia which has been asymptomatic. Metoprolol had been stopped a few months ago and she was not taking any anti-hypertensive's at home. Her BP elevated here and she was started on Low CCB. Cardiology signed off. Orthostatic's were negative. Will continue to titrate BP medications. Check CUS to r/o stenosis. Interval History/Subjective:   Denies any fevers, chills, cp, sob, nausea, vomiting or other problems today. Continues to have dizziness with standing     Review of Systems:   Comprehensive ROS negative except as mentioned above. Past Medical History:        Diagnosis Date    [facility] [address]     High blood pressure     Kidney trouble     Seizure ([facility] [address]     Shingles        Past Surgical History:        Procedure Laterality Date    BREAST SURGERY      HYSTERECTOMY      SHOULDER SURGERY      WRIST FRACTURE SURGERY Left 08/21/2017    ORIF left distal radius       Allergies:  Sulfa antibiotics    Past medical and surgical history reviewed. Any changes have been noted.      Scheduled and prn Medications:    Scheduled Meds:   amLODIPine  10 mg Oral Daily    vitamin C  500 mg Oral Daily    aspirin  81 mg Oral Daily    calcitRIOL  0.25 mcg Oral Daily    oyster shell calcium/vitamin D  2 tablet Oral Daily    vitamin D  2,000 Units Oral Daily    citalopram  10 mg Oral Daily    ferrous sulfate  325 mg Oral Daily with breakfast    furosemide  10 mg Oral Daily    levothyroxine  25 mcg Oral Daily    predniSONE  10 mg Oral Daily    sodium chloride flush  10 mL Intravenous 2 times per day    enoxaparin  30 mg Subcutaneous Daily    pneumococcal polyvalent  0.5 mL Intramuscular Once    atorvastatin  10 mg Oral Daily     Continuous Infusions:  PRN Meds:.meclizine, perflutren lipid microspheres, promethazine, sodium chloride flush, magnesium hydroxide, ondansetron, acetaminophen    PHYSICAL EXAM:  BP (!) 210/65   Pulse 55   Temp 98 °F (36.7 °C) (Oral)   Resp 18   Ht 5' (1.524 m)   Wt 132 lb 7.9 oz (60.1 kg)   SpO2 98%   BMI 25.88 kg/m²       Intake/Output Summary (Last 24 hours) at 12/16/18 1348  Last data filed at 12/16/18 0301   Gross per 24 hour   Intake              300 ml   Output              450 ml   Net             -150 ml       General: Alert and oriented. Resting in bed in no apparent distress. HEENT: Normocephalic. Atraumatic. Pupils equal and reactive. EOM intact. Oral mucosa pink/moist/intact. Ecchymosis noted to right eye, no hair noted on the crown of the head  Neck: Supple. Symmetrical. Trachea midline. Lungs: Clear to auscultation bilaterally. Respirations even and unlabored. Chest: Exam unremarkable. Cardiac: S1/S2 noted. Regular Rhythm and rate. Abdomen/GI: Soft. Non-tender. Non-distended. BS+. Extremities: PP+. Atraumatic. No redness/cyanosis/edema noted. Brisk cap refill. Skin: Dry and intact. No lesions noted. Neuro: Grossly intact. No focal deficits noted.      LABS:    Lab Results   Component Value Date    WBC 10.0 12/16/2018    HGB 8.9 (L) 12/16/2018    HCT 26.7 (L) 12/16/2018    MCV 94.9 12/16/2018     (L) 12/16/2018    LYMPHOPCT 7.6 12/15/2018    RBC 2.81 (L) 12/16/2018    MCH 31.6 12/16/2018    MCHC 33.3 12/16/2018    RDW 14.3 12/16/2018       Lab Results   Component Value Date    CREATININE 2.1 (H) 12/16/2018    BUN 59 (H) 12/16/2018     (H) 12/16/2018    K 3.9 12/16/2018     12/16/2018    CO2 29 12/16/2018       Lab Results   Component Value Date    MG 1.30 01/15/2016       Lab Results Component Value Date    ALT 10 01/14/2016    AST 18 01/14/2016    ALKPHOS 80 01/14/2016    BILITOT <0.2 01/14/2016        No flowsheet data found. Imaging:  CT Cervical Spine WO Contrast   Final Result   No evidence of fracture. Straightening of the cervical spine may be positional related to muscle spasm   or strain. Atypical sclerotic lesion of the left-sided skull base. Separate small   sclerotic lesion within the C4 vertebral body. If the patient has a history   of cancer, osteoblastic metastatic disease would be of concern. This could   be further evaluated with bone scan. XR HIP 2-3 VW W PELVIS RIGHT   Final Result   1. No acute traumatic injury involving the pelvis, right hip, or right knee. 2. Given the degree of osteopenia, nondisplaced fractures may be   radiographically occult. If pain or concern for hip fracture persists,   consider MR imaging. XR KNEE RIGHT (1-2 VIEWS)   Final Result   1. No acute traumatic injury involving the pelvis, right hip, or right knee. 2. Given the degree of osteopenia, nondisplaced fractures may be   radiographically occult. If pain or concern for hip fracture persists,   consider MR imaging. CT Head WO Contrast   Final Result   No acute intracranial abnormality. Mild generalized cerebral atrophy and chronic small vessel white matter   ischemic changes. XR CHEST PORTABLE   Final Result   1. Stable mild cardiomegaly. 2. No acute focal airspace consolidation. 3. Bandlike atelectasis or scarring at the left base, stable. VL DUP CAROTID BILATERAL    (Results Pending)       Assessment & Plan:      Dizziness  -  thought to be related to bradycardia however this is something that dates back to 2000. - Cardiology was consulted, no plans for PPM secondary to being asymptomatic with low heart rates. - signed off  - ?component of autonomic dysfunction?  Peripheral neuropathy  - previous CUS with less than 20%

## 2018-12-17 PROCEDURE — 6370000000 HC RX 637 (ALT 250 FOR IP): Performed by: INTERNAL MEDICINE

## 2018-12-17 PROCEDURE — 6370000000 HC RX 637 (ALT 250 FOR IP): Performed by: NURSE PRACTITIONER

## 2018-12-17 PROCEDURE — 97116 GAIT TRAINING THERAPY: CPT

## 2018-12-17 PROCEDURE — 6360000002 HC RX W HCPCS: Performed by: INTERNAL MEDICINE

## 2018-12-17 PROCEDURE — 97530 THERAPEUTIC ACTIVITIES: CPT

## 2018-12-17 PROCEDURE — 2580000003 HC RX 258: Performed by: INTERNAL MEDICINE

## 2018-12-17 PROCEDURE — 96372 THER/PROPH/DIAG INJ SC/IM: CPT

## 2018-12-17 PROCEDURE — 2060000000 HC ICU INTERMEDIATE R&B

## 2018-12-17 PROCEDURE — 97535 SELF CARE MNGMENT TRAINING: CPT

## 2018-12-17 PROCEDURE — 97110 THERAPEUTIC EXERCISES: CPT

## 2018-12-17 PROCEDURE — 99233 SBSQ HOSP IP/OBS HIGH 50: CPT | Performed by: INTERNAL MEDICINE

## 2018-12-17 PROCEDURE — 93880 EXTRACRANIAL BILAT STUDY: CPT

## 2018-12-17 RX ORDER — HYDRALAZINE HYDROCHLORIDE 25 MG/1
25 TABLET, FILM COATED ORAL EVERY 8 HOURS SCHEDULED
Status: DISCONTINUED | OUTPATIENT
Start: 2018-12-17 | End: 2018-12-17

## 2018-12-17 RX ORDER — HYDROCHLOROTHIAZIDE 25 MG/1
25 TABLET ORAL DAILY
Status: DISCONTINUED | OUTPATIENT
Start: 2018-12-17 | End: 2018-12-21 | Stop reason: HOSPADM

## 2018-12-17 RX ORDER — HYDRALAZINE HYDROCHLORIDE 50 MG/1
50 TABLET, FILM COATED ORAL EVERY 8 HOURS SCHEDULED
Status: DISCONTINUED | OUTPATIENT
Start: 2018-12-17 | End: 2018-12-21 | Stop reason: HOSPADM

## 2018-12-17 RX ORDER — HYDRALAZINE HYDROCHLORIDE 50 MG/1
100 TABLET, FILM COATED ORAL EVERY 8 HOURS SCHEDULED
Status: DISCONTINUED | OUTPATIENT
Start: 2018-12-17 | End: 2018-12-17

## 2018-12-17 RX ADMIN — HYDRALAZINE HYDROCHLORIDE 25 MG: 25 TABLET, FILM COATED ORAL at 08:08

## 2018-12-17 RX ADMIN — FERROUS SULFATE TAB EC 324 MG (65 MG FE EQUIVALENT) 325 MG: 324 (65 FE) TABLET DELAYED RESPONSE at 08:08

## 2018-12-17 RX ADMIN — CITALOPRAM HYDROBROMIDE 10 MG: 10 TABLET ORAL at 08:08

## 2018-12-17 RX ADMIN — Medication 10 ML: at 08:09

## 2018-12-17 RX ADMIN — PREDNISONE 10 MG: 10 TABLET ORAL at 08:11

## 2018-12-17 RX ADMIN — AMLODIPINE BESYLATE 10 MG: 10 TABLET ORAL at 08:08

## 2018-12-17 RX ADMIN — HYDRALAZINE HYDROCHLORIDE 50 MG: 50 TABLET, FILM COATED ORAL at 23:01

## 2018-12-17 RX ADMIN — ACETAMINOPHEN 650 MG: 325 TABLET ORAL at 08:07

## 2018-12-17 RX ADMIN — ASPIRIN 81 MG 81 MG: 81 TABLET ORAL at 08:08

## 2018-12-17 RX ADMIN — HYDROCHLOROTHIAZIDE 25 MG: 25 TABLET ORAL at 21:16

## 2018-12-17 RX ADMIN — CALCITRIOL 0.25 MCG: 0.25 CAPSULE ORAL at 08:08

## 2018-12-17 RX ADMIN — FUROSEMIDE 10 MG: 20 TABLET ORAL at 08:08

## 2018-12-17 RX ADMIN — VITAMIN D, TAB 1000IU (100/BT) 2000 UNITS: 25 TAB at 08:12

## 2018-12-17 RX ADMIN — Medication 10 ML: at 21:16

## 2018-12-17 RX ADMIN — ATORVASTATIN CALCIUM 10 MG: 10 TABLET, FILM COATED ORAL at 08:08

## 2018-12-17 RX ADMIN — OXYCODONE HYDROCHLORIDE AND ACETAMINOPHEN 500 MG: 500 TABLET ORAL at 08:08

## 2018-12-17 RX ADMIN — HYDRALAZINE HYDROCHLORIDE 25 MG: 25 TABLET, FILM COATED ORAL at 14:26

## 2018-12-17 RX ADMIN — CALCIUM CARBONATE-CHOLECALCIFEROL TAB 250 MG-125 UNIT 500 MG: 250-125 TAB at 08:08

## 2018-12-17 RX ADMIN — LEVOTHYROXINE SODIUM 25 MCG: 25 TABLET ORAL at 06:46

## 2018-12-17 RX ADMIN — ENOXAPARIN SODIUM 30 MG: 30 INJECTION SUBCUTANEOUS at 08:09

## 2018-12-17 ASSESSMENT — PAIN DESCRIPTION - LOCATION: LOCATION: BACK

## 2018-12-17 ASSESSMENT — PAIN SCALES - GENERAL
PAINLEVEL_OUTOF10: 5
PAINLEVEL_OUTOF10: 3

## 2018-12-17 ASSESSMENT — PAIN DESCRIPTION - PAIN TYPE: TYPE: ACUTE PAIN;CHRONIC PAIN

## 2018-12-17 NOTE — PLAN OF CARE
Problem: Risk for Impaired Skin Integrity  Goal: Tissue integrity - skin and mucous membranes  Structural intactness and normal physiological function of skin and  mucous membranes. Outcome: Ongoing  Skin assessment performed each shift per protocol. Skin care protocol in place. Pt able to turn self. Specialty mattress in place.

## 2018-12-17 NOTE — PROGRESS NOTES
Meds:   hydrALAZINE  25 mg Oral 3 times per day    amLODIPine  10 mg Oral Daily    vitamin C  500 mg Oral Daily    aspirin  81 mg Oral Daily    calcitRIOL  0.25 mcg Oral Daily    oyster shell calcium/vitamin D  2 tablet Oral Daily    vitamin D  2,000 Units Oral Daily    citalopram  10 mg Oral Daily    ferrous sulfate  325 mg Oral Daily with breakfast    furosemide  10 mg Oral Daily    levothyroxine  25 mcg Oral Daily    predniSONE  10 mg Oral Daily    sodium chloride flush  10 mL Intravenous 2 times per day    enoxaparin  30 mg Subcutaneous Daily    pneumococcal polyvalent  0.5 mL Intramuscular Once    atorvastatin  10 mg Oral Daily     Continuous Infusions:  PRN Meds:.meclizine, perflutren lipid microspheres, promethazine, sodium chloride flush, magnesium hydroxide, ondansetron, acetaminophen    PHYSICAL EXAM:  BP (!) 199/72   Pulse 55   Temp 98.2 °F (36.8 °C) (Oral)   Resp 18   Ht 5' (1.524 m)   Wt 132 lb 4.4 oz (60 kg)   SpO2 95%   BMI 25.83 kg/m²       Intake/Output Summary (Last 24 hours) at 12/17/18 1036  Last data filed at 12/17/18 0836   Gross per 24 hour   Intake              400 ml   Output             1950 ml   Net            -1550 ml       General: Alert and oriented. Up at bedside in NAD. Pleasant and cooperative. Grandson at bedside. HEENT: Normocephalic. Pupils equal and reactive. EOM intact. Oral mucosa pink/moist/intact. Ecchymosis noted to right eye, no hair noted on the crown of the head  Neck: Supple. Symmetrical. Trachea midline. Lungs: Clear to auscultation bilaterally. Respirations even and unlabored. Chest: Exam unremarkable. Cardiac: S1/S2 noted. Regular Rhythm and rate. Abdomen/GI: Soft. Non-tender. Non-distended. BS+. Extremities: PP+. Atraumatic. No redness/cyanosis/edema noted. Brisk cap refill. Skin: Dry and intact. No lesions noted. Neuro: Grossly intact. No focal deficits noted.      LABS:    Lab Results   Component Value Date    WBC 10.0 12/16/2018

## 2018-12-18 PROCEDURE — 99232 SBSQ HOSP IP/OBS MODERATE 35: CPT | Performed by: INTERNAL MEDICINE

## 2018-12-18 PROCEDURE — 2580000003 HC RX 258: Performed by: INTERNAL MEDICINE

## 2018-12-18 PROCEDURE — 2060000000 HC ICU INTERMEDIATE R&B

## 2018-12-18 PROCEDURE — 6370000000 HC RX 637 (ALT 250 FOR IP): Performed by: NURSE PRACTITIONER

## 2018-12-18 PROCEDURE — 97530 THERAPEUTIC ACTIVITIES: CPT

## 2018-12-18 PROCEDURE — 97116 GAIT TRAINING THERAPY: CPT

## 2018-12-18 PROCEDURE — 94760 N-INVAS EAR/PLS OXIMETRY 1: CPT

## 2018-12-18 PROCEDURE — 96372 THER/PROPH/DIAG INJ SC/IM: CPT

## 2018-12-18 PROCEDURE — 6370000000 HC RX 637 (ALT 250 FOR IP): Performed by: INTERNAL MEDICINE

## 2018-12-18 PROCEDURE — 97110 THERAPEUTIC EXERCISES: CPT

## 2018-12-18 PROCEDURE — 6360000002 HC RX W HCPCS: Performed by: INTERNAL MEDICINE

## 2018-12-18 RX ORDER — HYDROCHLOROTHIAZIDE 25 MG/1
25 TABLET ORAL DAILY
Qty: 30 TABLET | Refills: 0 | Status: SHIPPED | OUTPATIENT
Start: 2018-12-19

## 2018-12-18 RX ORDER — HYDRALAZINE HYDROCHLORIDE 50 MG/1
50 TABLET, FILM COATED ORAL EVERY 8 HOURS SCHEDULED
Qty: 90 TABLET | Refills: 0 | Status: SHIPPED | OUTPATIENT
Start: 2018-12-18

## 2018-12-18 RX ORDER — AMLODIPINE BESYLATE 10 MG/1
10 TABLET ORAL DAILY
Qty: 30 TABLET | Refills: 0 | Status: SHIPPED | OUTPATIENT
Start: 2018-12-19 | End: 2019-02-20

## 2018-12-18 RX ADMIN — HYDRALAZINE HYDROCHLORIDE 50 MG: 50 TABLET, FILM COATED ORAL at 05:28

## 2018-12-18 RX ADMIN — CALCITRIOL 0.25 MCG: 0.25 CAPSULE ORAL at 08:15

## 2018-12-18 RX ADMIN — CALCIUM CARBONATE-CHOLECALCIFEROL TAB 250 MG-125 UNIT 500 MG: 250-125 TAB at 08:15

## 2018-12-18 RX ADMIN — PREDNISONE 10 MG: 10 TABLET ORAL at 08:16

## 2018-12-18 RX ADMIN — VITAMIN D, TAB 1000IU (100/BT) 2000 UNITS: 25 TAB at 08:16

## 2018-12-18 RX ADMIN — ATORVASTATIN CALCIUM 10 MG: 10 TABLET, FILM COATED ORAL at 08:15

## 2018-12-18 RX ADMIN — HYDRALAZINE HYDROCHLORIDE 50 MG: 50 TABLET, FILM COATED ORAL at 14:52

## 2018-12-18 RX ADMIN — HYDROCHLOROTHIAZIDE 25 MG: 25 TABLET ORAL at 08:15

## 2018-12-18 RX ADMIN — HYDRALAZINE HYDROCHLORIDE 50 MG: 50 TABLET, FILM COATED ORAL at 20:46

## 2018-12-18 RX ADMIN — FERROUS SULFATE TAB EC 324 MG (65 MG FE EQUIVALENT) 325 MG: 324 (65 FE) TABLET DELAYED RESPONSE at 08:15

## 2018-12-18 RX ADMIN — CITALOPRAM HYDROBROMIDE 10 MG: 10 TABLET ORAL at 08:16

## 2018-12-18 RX ADMIN — ASPIRIN 81 MG 81 MG: 81 TABLET ORAL at 08:16

## 2018-12-18 RX ADMIN — LEVOTHYROXINE SODIUM 25 MCG: 25 TABLET ORAL at 05:28

## 2018-12-18 RX ADMIN — Medication 10 ML: at 20:47

## 2018-12-18 RX ADMIN — ENOXAPARIN SODIUM 30 MG: 30 INJECTION SUBCUTANEOUS at 08:16

## 2018-12-18 RX ADMIN — OXYCODONE HYDROCHLORIDE AND ACETAMINOPHEN 500 MG: 500 TABLET ORAL at 08:17

## 2018-12-18 RX ADMIN — Medication 10 ML: at 08:17

## 2018-12-18 RX ADMIN — AMLODIPINE BESYLATE 10 MG: 10 TABLET ORAL at 08:15

## 2018-12-18 ASSESSMENT — PAIN SCALES - WONG BAKER: WONGBAKER_NUMERICALRESPONSE: 0

## 2018-12-18 NOTE — PROGRESS NOTES
[address]  Cardiology Progress Note        CC:      Elevated troponin            HPI:   This is a 77 y.o. female apparently fell twice last night and was unable to get up. Her nurse came this morning to see her. The patient felt strong enough to get her and ounces the door. In the ER, she was complaining of weakness in the legs, generalized fatigue and right-sided hip and knee pain. We are asked to see the patient for troponin elevation. Patient states she has been feeling dizzy and week. It comes on her. If she does not hold onto something she will fall. She does not pass out. Her home medications include amlodipine, metoprolol, hydrochlorothiazide, furosemide, atorvastatin and aspirin. However the list provided by the family does not have amlodipine    There is an angiogram on her done by Dr. Otilia Montes in 2016 which showed mild to moderate disease. H/o Takatsubo syndrome which has resolved with 2016 echo showing LVEF 55-65%. Interval history: No further falls while in hospital. Still feels weak all over. No identifiable triggers for such. Has been on metoprolol in the recent past, but has been taken off while hospitalized. Telemetry shows SR 50's while at rest and awake. While ambulating, HR reached 90's bpm. Known to have Sinus bradycardia 50's bpm in the past without symptoms.         Past Medical History:   Diagnosis Date    Cancer ([address]     High blood pressure     Kidney trouble     [facility] [address] [facility]     Shingles       Past Surgical History:   Procedure Laterality Date    BREAST SURGERY      HYSTERECTOMY      SHOULDER SURGERY      WRIST FRACTURE SURGERY Left 08/21/2017    ORIF left distal radius      Family History   Problem Relation Age of Onset    Cancer Mother     Stroke Mother     Cancer Father     High Blood Pressure Father       Social History   Substance Use Topics    Smoking status: Never Smoker    Smokeless tobacco: Never Used    Alcohol use No     Allergies   Allergen Reactions    Sulfa Antibiotics       hydrALAZINE  25 mg Oral 3 times per day    amLODIPine  10 mg Oral Daily    vitamin C  500 mg Oral Daily    aspirin  81 mg Oral Daily    calcitRIOL  0.25 mcg Oral Daily    oyster shell calcium/vitamin D  2 tablet Oral Daily    vitamin D  2,000 Units Oral Daily    citalopram  10 mg Oral Daily    ferrous sulfate  325 mg Oral Daily with breakfast    furosemide  10 mg Oral Daily    levothyroxine  25 mcg Oral Daily    predniSONE  10 mg Oral Daily    sodium chloride flush  10 mL Intravenous 2 times per day    enoxaparin  30 mg Subcutaneous Daily    pneumococcal polyvalent  0.5 mL Intramuscular Once    atorvastatin  10 mg Oral Daily       Review of Systems -   Constitutional: Negative for weight gain/loss; malaise, fever  Respiratory: Negative for Asthma;  cough and hemoptysis  Cardiovascular: Negative for palpitations,dizziness   Gastrointestinal: Negative for abd.pain; constipation/diarrhea;    Genitourinary: Negative for stones; hematuria; frequency hesitancy  Integumentt: Negative for rash or pruritis  Hematologic/lymphatic: Negative for blood dyscrasia; leukemia/lymphoma  Musculoskeletal: Negative for Connective tissue disease  Neurological:  Negative for Seizure   Behavioral/Psych:Negative for Bipolar disorder, Schizophrenia; Dementia  Endocrine: negative for thyroid, parathyroid disease      Intake/Output Summary (Last 24 hours) at 12/17/18 1923  Last data filed at 12/17/18 1816   Gross per 24 hour   Intake              480 ml   Output             1800 ml   Net            -1320 ml       Physical Examination:    BP (!) 161/67   Pulse 55   Temp 98.1 °F (36.7 °C) (Oral)   Resp 18   Ht 5' (1.524 m)   Wt 132 lb 4.4 oz (60 kg)   SpO2 96%   BMI 25.83 kg/m²    HEENT:  Face: Contuusion on forehead; Cushingoid face, Conjunctiva: Pink; non icteric,  Mucous Memb:  Moist, No thyromegaly or Lymphadenopathy  Respiratory:  Resp Assessment: normal, Resp Auscultation: clear

## 2018-12-18 NOTE — DISCHARGE INSTR - COC
Score:  Readmission Risk              Risk of Unplanned Readmission:        17           Discharging to Facility/ Agency   · Name: [facility]   · Address: [place] [address]  · Phone: [phone]  · Fax: [phone]    / signature: Electronically signed by PITO Chase on 12/18/18 at 12:27 PM    PHYSICIAN SECTION    Prognosis: Good    Condition at Discharge: Stable    Rehab Potential (if transferring to Rehab): Good    Recommended Labs or Other Treatments After Discharge: Continue taking medications as prescribed. F/u with PCP within 1 week of discharge. Continue PT/OT in SNF. Physician Certification: I certify the above information and transfer of Tabitha Bah  is necessary for the continuing treatment of the diagnosis listed and that she requires [address] for less 30 days. Update Admission H&P: Changes in H&P as follows - The patient was admitted for dizziness s/p fall. She was seen by cardiology and medications were adjusted as needed. She was seen by PT/OT.  At this time, she is able to be discharged to home with [facility] for continued therapies    PHYSICIAN SIGNATURE:  Electronically signed by JANINE Eric NP on 12/21/18 at 16:00 PM/Dr. Barbara Funez

## 2018-12-18 NOTE — PROGRESS NOTES
Physical Therapy    Daily Treatment Note    Patient Name: Mehrdad Abdullahi Record Number: [id number]  Room Number: G1G-0034/4059-08    ASSESSMENT: The pt is a 76 yo female admitted from home after having 2 falls in 1 day. The pt reports she lives alone with the asisstance of her gr-son and his wife. She uses a QC on the stairs and when out but more \"furniture\" walks in the home. The pt completes self-care on her own and gets assist for homemaking. The pt reports she still drives. Today, she had one failed attempt to stand up with SBA (fell back into chair) and otherwise required CGA to stand up and to ambulate with a rolling walker. She continues to be high risk for falls and unsafe to return home alone. Recommend continued skilled PT at discharge to progress her to complete independence. Discharge Recommendations: Patient would benefit from continued therapy after discharge, 3-5 sessions per week  Equipment Needs:      Admission Date: 12/14/2018  9:38 AM    Additional Pertinent Hx: Per H&P on 12-14-18 of Ramos Mccord MD: The pt came to the ED after having 2 falls the night before. The pt apparently was down for a few hours. The pt c/o of weakness and R hip and knee pain. PMHx: Ca, HTN, seizure, shingles, kidney, L wrist ORIF, shd sx, h/o of multiple skin grafts  Diagnosis: Recurrent falls/Generalized weakness, Elevated troponin, Sinus bradycardia  Restrictions/Precautions: Fall Risk Other position/activity restrictions: R elbow and R eye contusions     PMHx:  has a past medical history of Cancer ([address] High blood pressure; Kidney trouble; Seizure ([address] and Shingles.   PSgHx:   Past Surgical History:   Procedure Laterality Date    BREAST SURGERY      HYSTERECTOMY      SHOULDER SURGERY      WRIST FRACTURE SURGERY Left 08/21/2017    ORIF left distal radius       Home Environment / Functional History  Social/Functional History  Lives With: Alone (with a big dog)  Type of Home: House  Home Layout:

## 2018-12-18 NOTE — DISCHARGE SUMMARY
Daily      calcitRIOL (ROCALTROL) 0.25 MCG capsule Take 0.25 mcg by mouth three times a week Monday, Wednesday and Friday      calcium-vitamin D (OSCAL-500) 500-200 MG-UNIT per tablet Take 1 tablet by mouth daily. The patient was seen and examined on day of discharge and this discharge summary is in conjunction with any daily progress note from day of discharge. Hospital Course: The patient is a 69-year-old female who presented to the ED with complaints of generalized weakness, who also sustained a fall. She noted ongoing h/o dizziness/lightheaded that has been present for several years. She has a h/o cardiomyopathy Takasubo, that was resolved on her last echocardiogram. Cardiology was consulted for bradycardia, which had been asymptomatic. Metoprolol had been stopped prior to admission and the patient had not been taking antihypertensives at home. Her BP was elevated CCB and hydralazine were initiated and titrated with goal SBP of less than 160. There was no need at this time for PPM, as her bradycardia was asymptomatic and HR did respond to changes in position and activity. Orthostats were negative. Carotid US showed stenosis < 50% bilaterally, however, given her hx of falls, she is not an ideal candidate for plavix. At this time, the patient is able to be discharged to SNF in stable condition with continued PT/OT prior to discharge home. Exam:     /61   Pulse 64   Temp 97.9 °F (36.6 °C) (Oral)   Resp 16   Ht 5' (1.524 m)   Wt 129 lb 3 oz (58.6 kg)   SpO2 97%   BMI 25.23 kg/m²     General: Alert and oriented. Up at bedside in NAD. Pleasant and cooperative. HEENT: Normocephalic. Pupils equal and reactive. EOM intact. Oral mucosa pink/moist/intact. Ecchymosis noted to right eye, no hair noted on the crown of the head  Neck: Supple. Symmetrical. Trachea midline. Lungs: Clear to auscultation bilaterally. Respirations even and unlabored. Chest: Exam unremarkable.   Cardiac: S1/S2

## 2018-12-18 NOTE — PROGRESS NOTES
Scalp cleansed with vashe solution, hydrogel applied.  Patient tolerated well Electronically signed by Millie Edwards RN on 12/18/2018 at 9:38 AM

## 2018-12-19 PROCEDURE — 6370000000 HC RX 637 (ALT 250 FOR IP): Performed by: INTERNAL MEDICINE

## 2018-12-19 PROCEDURE — 6370000000 HC RX 637 (ALT 250 FOR IP): Performed by: NURSE PRACTITIONER

## 2018-12-19 PROCEDURE — 2060000000 HC ICU INTERMEDIATE R&B

## 2018-12-19 PROCEDURE — 6360000002 HC RX W HCPCS: Performed by: INTERNAL MEDICINE

## 2018-12-19 PROCEDURE — 96372 THER/PROPH/DIAG INJ SC/IM: CPT

## 2018-12-19 PROCEDURE — 2580000003 HC RX 258: Performed by: INTERNAL MEDICINE

## 2018-12-19 RX ADMIN — FERROUS SULFATE TAB EC 324 MG (65 MG FE EQUIVALENT) 324 MG: 324 (65 FE) TABLET DELAYED RESPONSE at 08:43

## 2018-12-19 RX ADMIN — ENOXAPARIN SODIUM 30 MG: 30 INJECTION SUBCUTANEOUS at 08:42

## 2018-12-19 RX ADMIN — AMLODIPINE BESYLATE 10 MG: 10 TABLET ORAL at 08:42

## 2018-12-19 RX ADMIN — HYDRALAZINE HYDROCHLORIDE 50 MG: 50 TABLET, FILM COATED ORAL at 06:12

## 2018-12-19 RX ADMIN — CALCIUM CARBONATE-CHOLECALCIFEROL TAB 250 MG-125 UNIT 500 MG: 250-125 TAB at 08:43

## 2018-12-19 RX ADMIN — CITALOPRAM HYDROBROMIDE 10 MG: 10 TABLET ORAL at 08:42

## 2018-12-19 RX ADMIN — ATORVASTATIN CALCIUM 10 MG: 10 TABLET, FILM COATED ORAL at 08:43

## 2018-12-19 RX ADMIN — PREDNISONE 10 MG: 10 TABLET ORAL at 08:43

## 2018-12-19 RX ADMIN — CALCITRIOL 0.25 MCG: 0.25 CAPSULE ORAL at 08:43

## 2018-12-19 RX ADMIN — HYDRALAZINE HYDROCHLORIDE 50 MG: 50 TABLET, FILM COATED ORAL at 14:28

## 2018-12-19 RX ADMIN — VITAMIN D, TAB 1000IU (100/BT) 2000 UNITS: 25 TAB at 08:42

## 2018-12-19 RX ADMIN — OXYCODONE HYDROCHLORIDE AND ACETAMINOPHEN 500 MG: 500 TABLET ORAL at 08:42

## 2018-12-19 RX ADMIN — HYDRALAZINE HYDROCHLORIDE 50 MG: 50 TABLET, FILM COATED ORAL at 20:57

## 2018-12-19 RX ADMIN — LEVOTHYROXINE SODIUM 25 MCG: 25 TABLET ORAL at 06:12

## 2018-12-19 RX ADMIN — ASPIRIN 81 MG 81 MG: 81 TABLET ORAL at 08:42

## 2018-12-19 RX ADMIN — HYDROCHLOROTHIAZIDE 25 MG: 25 TABLET ORAL at 08:42

## 2018-12-19 RX ADMIN — Medication 10 ML: at 08:43

## 2018-12-19 NOTE — DISCHARGE SUMMARY
S1/S2 noted. Regular Rhythm and rate. Abdomen/GI: Soft. Non-tender. Non-distended. BS+. Extremities: PP+. Atraumatic. No redness/cyanosis/edema noted. Brisk cap refill. Skin: Dry and intact. No lesions noted. Neuro: Grossly intact. No focal deficits noted. Consults:     IP CONSULT TO SOCIAL WORK  IP CONSULT TO CARDIOLOGY  IP CONSULT TO SOCIAL WORK    Significant Diagnostic Studies:     VL DUP CAROTID BILATERAL   Final Result      CT Cervical Spine WO Contrast   Final Result   No evidence of fracture. Straightening of the cervical spine may be positional related to muscle spasm   or strain. Atypical sclerotic lesion of the left-sided skull base. Separate small   sclerotic lesion within the C4 vertebral body. If the patient has a history   of cancer, osteoblastic metastatic disease would be of concern. This could   be further evaluated with bone scan. XR HIP 2-3 VW W PELVIS RIGHT   Final Result   1. No acute traumatic injury involving the pelvis, right hip, or right knee. 2. Given the degree of osteopenia, nondisplaced fractures may be   radiographically occult. If pain or concern for hip fracture persists,   consider MR imaging. XR KNEE RIGHT (1-2 VIEWS)   Final Result   1. No acute traumatic injury involving the pelvis, right hip, or right knee. 2. Given the degree of osteopenia, nondisplaced fractures may be   radiographically occult. If pain or concern for hip fracture persists,   consider MR imaging. CT Head WO Contrast   Final Result   No acute intracranial abnormality. Mild generalized cerebral atrophy and chronic small vessel white matter   ischemic changes. XR CHEST PORTABLE   Final Result   1. Stable mild cardiomegaly. 2. No acute focal airspace consolidation. 3. Bandlike atelectasis or scarring at the left base, stable.            11/18/18 Carotid US  Tech Comments  Right  The right internal carotid artery appears to have a <50% diameter Neuro: Grossly intact. No focal deficits noted. Assessment and plan otherwise unchanged from 12/18/18 discharge summary.     JANINE Calero - NP  12/19/18  10:49 AM

## 2018-12-19 NOTE — CARE COORDINATION
precert not obtained today 12/19  Will have to cancel transport for 5pm to [facility]  Patient and family notified  SW will assist with transport to facility once precert is obtaine    Electronically signed by PITO Bustos on 12/19/2018 at 4:11 PM  [phone]

## 2018-12-19 NOTE — PLAN OF CARE
Problem: Risk for Impaired Skin Integrity  Goal: Tissue integrity - skin and mucous membranes  Structural intactness and normal physiological function of skin and  mucous membranes.    Outcome: Ongoing      Problem: Falls - Risk of:  Goal: Will remain free from falls  Will remain free from falls   Outcome: Ongoing    Goal: Absence of physical injury  Absence of physical injury   Outcome: Ongoing      Problem: Pain:  Goal: Pain level will decrease  Pain level will decrease   Outcome: Ongoing    Goal: Control of acute pain  Control of acute pain   Outcome: Ongoing    Goal: Control of chronic pain  Control of chronic pain   Outcome: Ongoing

## 2018-12-20 LAB — PLATELET # BLD: 171 K/UL (ref 135–450)

## 2018-12-20 PROCEDURE — 6360000002 HC RX W HCPCS: Performed by: INTERNAL MEDICINE

## 2018-12-20 PROCEDURE — 97116 GAIT TRAINING THERAPY: CPT

## 2018-12-20 PROCEDURE — 97110 THERAPEUTIC EXERCISES: CPT

## 2018-12-20 PROCEDURE — 2580000003 HC RX 258: Performed by: INTERNAL MEDICINE

## 2018-12-20 PROCEDURE — 6370000000 HC RX 637 (ALT 250 FOR IP): Performed by: INTERNAL MEDICINE

## 2018-12-20 PROCEDURE — 94760 N-INVAS EAR/PLS OXIMETRY 1: CPT

## 2018-12-20 PROCEDURE — 97535 SELF CARE MNGMENT TRAINING: CPT

## 2018-12-20 PROCEDURE — 6370000000 HC RX 637 (ALT 250 FOR IP): Performed by: NURSE PRACTITIONER

## 2018-12-20 PROCEDURE — 36415 COLL VENOUS BLD VENIPUNCTURE: CPT

## 2018-12-20 PROCEDURE — 97530 THERAPEUTIC ACTIVITIES: CPT

## 2018-12-20 PROCEDURE — 85049 AUTOMATED PLATELET COUNT: CPT

## 2018-12-20 PROCEDURE — 2060000000 HC ICU INTERMEDIATE R&B

## 2018-12-20 PROCEDURE — 96372 THER/PROPH/DIAG INJ SC/IM: CPT

## 2018-12-20 RX ADMIN — HYDRALAZINE HYDROCHLORIDE 50 MG: 50 TABLET, FILM COATED ORAL at 14:13

## 2018-12-20 RX ADMIN — Medication 10 ML: at 08:41

## 2018-12-20 RX ADMIN — LEVOTHYROXINE SODIUM 25 MCG: 25 TABLET ORAL at 05:17

## 2018-12-20 RX ADMIN — CITALOPRAM HYDROBROMIDE 10 MG: 10 TABLET ORAL at 08:40

## 2018-12-20 RX ADMIN — PREDNISONE 10 MG: 10 TABLET ORAL at 08:41

## 2018-12-20 RX ADMIN — CALCIUM CARBONATE-CHOLECALCIFEROL TAB 250 MG-125 UNIT 500 MG: 250-125 TAB at 08:40

## 2018-12-20 RX ADMIN — ACETAMINOPHEN 650 MG: 325 TABLET ORAL at 12:38

## 2018-12-20 RX ADMIN — ASPIRIN 81 MG 81 MG: 81 TABLET ORAL at 08:41

## 2018-12-20 RX ADMIN — HYDRALAZINE HYDROCHLORIDE 50 MG: 50 TABLET, FILM COATED ORAL at 21:10

## 2018-12-20 RX ADMIN — FERROUS SULFATE TAB EC 324 MG (65 MG FE EQUIVALENT) 324 MG: 324 (65 FE) TABLET DELAYED RESPONSE at 08:40

## 2018-12-20 RX ADMIN — OXYCODONE HYDROCHLORIDE AND ACETAMINOPHEN 500 MG: 500 TABLET ORAL at 08:40

## 2018-12-20 RX ADMIN — HYDROCHLOROTHIAZIDE 25 MG: 25 TABLET ORAL at 08:40

## 2018-12-20 RX ADMIN — AMLODIPINE BESYLATE 10 MG: 10 TABLET ORAL at 08:40

## 2018-12-20 RX ADMIN — Medication 10 ML: at 21:13

## 2018-12-20 RX ADMIN — CALCITRIOL 0.25 MCG: 0.25 CAPSULE ORAL at 08:40

## 2018-12-20 RX ADMIN — HYDRALAZINE HYDROCHLORIDE 50 MG: 50 TABLET, FILM COATED ORAL at 05:17

## 2018-12-20 RX ADMIN — ENOXAPARIN SODIUM 30 MG: 30 INJECTION SUBCUTANEOUS at 08:41

## 2018-12-20 RX ADMIN — VITAMIN D, TAB 1000IU (100/BT) 2000 UNITS: 25 TAB at 08:40

## 2018-12-20 RX ADMIN — ATORVASTATIN CALCIUM 10 MG: 10 TABLET, FILM COATED ORAL at 08:40

## 2018-12-20 ASSESSMENT — PAIN SCALES - GENERAL
PAINLEVEL_OUTOF10: 6
PAINLEVEL_OUTOF10: 0
PAINLEVEL_OUTOF10: 2
PAINLEVEL_OUTOF10: 0

## 2018-12-20 NOTE — CARE COORDINATION
Discharge Planning:  SW met with patient to discuss d/c options  Peer to peer was not done today- will wait for answer tomorrow 12/21    SW explained to patient if insurance denies SNF stay - we will need to come up with a back up plan   Patient very tearful - SW provides active listening and empathy  Patient continues to states that she feels so weak     Patient would be agreeable to homecare if SNF stay is denied  SW spoke with patients nephew Micaela Sanchez per request of patient  He states that if SNF is denied, family will figure out a plan to assist with transitioning patient back home  Home care will need to be frontloaded    SW will continue to follow and assist as needed    Electronically signed by PITO Esposito on 12/20/2018 at 4:09 PM  [phone]

## 2018-12-20 NOTE — CARE COORDINATION
SNF denied per [facility].  Called [phone] x [phone] to schedule peer to peer w/ J Nida NP    Electronically signed by Sia Smith RN on 12/20/2018 at 9:27 AM

## 2018-12-21 VITALS
SYSTOLIC BLOOD PRESSURE: 149 MMHG | DIASTOLIC BLOOD PRESSURE: 56 MMHG | WEIGHT: 136.24 LBS | HEIGHT: 60 IN | TEMPERATURE: 98 F | OXYGEN SATURATION: 97 % | HEART RATE: 65 BPM | BODY MASS INDEX: 26.75 KG/M2 | RESPIRATION RATE: 18 BRPM

## 2018-12-21 PROCEDURE — 6370000000 HC RX 637 (ALT 250 FOR IP): Performed by: INTERNAL MEDICINE

## 2018-12-21 PROCEDURE — 97530 THERAPEUTIC ACTIVITIES: CPT

## 2018-12-21 PROCEDURE — 2580000003 HC RX 258: Performed by: INTERNAL MEDICINE

## 2018-12-21 PROCEDURE — 6370000000 HC RX 637 (ALT 250 FOR IP): Performed by: NURSE PRACTITIONER

## 2018-12-21 PROCEDURE — 6360000002 HC RX W HCPCS: Performed by: INTERNAL MEDICINE

## 2018-12-21 PROCEDURE — 94760 N-INVAS EAR/PLS OXIMETRY 1: CPT

## 2018-12-21 PROCEDURE — 97116 GAIT TRAINING THERAPY: CPT

## 2018-12-21 RX ADMIN — HYDROCHLOROTHIAZIDE 25 MG: 25 TABLET ORAL at 09:23

## 2018-12-21 RX ADMIN — CALCIUM CARBONATE-CHOLECALCIFEROL TAB 250 MG-125 UNIT 500 MG: 250-125 TAB at 09:23

## 2018-12-21 RX ADMIN — VITAMIN D, TAB 1000IU (100/BT) 2000 UNITS: 25 TAB at 09:22

## 2018-12-21 RX ADMIN — HYDRALAZINE HYDROCHLORIDE 50 MG: 50 TABLET, FILM COATED ORAL at 13:44

## 2018-12-21 RX ADMIN — ASPIRIN 81 MG 81 MG: 81 TABLET ORAL at 09:23

## 2018-12-21 RX ADMIN — FERROUS SULFATE TAB EC 324 MG (65 MG FE EQUIVALENT) 325 MG: 324 (65 FE) TABLET DELAYED RESPONSE at 09:28

## 2018-12-21 RX ADMIN — CITALOPRAM HYDROBROMIDE 10 MG: 10 TABLET ORAL at 09:23

## 2018-12-21 RX ADMIN — OXYCODONE HYDROCHLORIDE AND ACETAMINOPHEN 500 MG: 500 TABLET ORAL at 09:23

## 2018-12-21 RX ADMIN — CALCITRIOL 0.25 MCG: 0.25 CAPSULE ORAL at 09:23

## 2018-12-21 RX ADMIN — Medication 10 ML: at 09:28

## 2018-12-21 RX ADMIN — LEVOTHYROXINE SODIUM 25 MCG: 25 TABLET ORAL at 06:15

## 2018-12-21 RX ADMIN — AMLODIPINE BESYLATE 10 MG: 10 TABLET ORAL at 09:23

## 2018-12-21 RX ADMIN — PREDNISONE 10 MG: 10 TABLET ORAL at 09:23

## 2018-12-21 RX ADMIN — HYDRALAZINE HYDROCHLORIDE 50 MG: 50 TABLET, FILM COATED ORAL at 06:15

## 2018-12-21 RX ADMIN — ATORVASTATIN CALCIUM 10 MG: 10 TABLET, FILM COATED ORAL at 09:23

## 2018-12-21 ASSESSMENT — PAIN SCALES - GENERAL: PAINLEVEL_OUTOF10: 0

## 2018-12-21 NOTE — PROGRESS NOTES
this recommendation then recommend that patient obtain 24* supervision/assist (doubtful patient will be able to obtain this) and level 3 home PT. Will follow. Una Poe scored a 17/24 on the AM-PAC short mobility form. Initial research suggests that an AM-PAC score of 18 or greater may be associated with a discharge to patient's home setting, however this same research reports a standardized positive predictive value of 0.75 indicating that 25% of patients with a score of 18 or greater actually went to a rehab facility. Based on my clinical judgement I recommend that the patient have 3-5 sessions per week of Physical Therapy at d/c to increase the patients independence. Goals:  Time Frame for Short term goals: upon d/c - all goals ongoing 12/21/18 except as noted below  Short term goal 1: Transfer sit <> stand with SBA. Short term goal 2: Ambulate with wheeled walker 50 feet with SBA. Short term goal 3: Negotiate stairs with min A. Short term goal 4: New Goal 12/20/18: Pt will perform 10 reps of standing balance exercise with 1 UE support and SBA. Safety devices:   Type of devices: Call light within reach, Chair alarm in place, Gait belt, Patient at risk for falls, Left in chair, All fall risk precautions in place, Nurse notified (MEETA Marquez notified)        PLAN:  Times per week: 3-5x/week while in the hospital;    Current Treatment Recommendations: Strengthening, Functional Mobility Training, Transfer Training, Gait Training, Stair training, Patient/Caregiver Education & Training, Safety Education & Training    If patient is discharged prior to next treatment, this note will serve as the discharge summary.     Therapy Time    Individual Concurrent Group Co-treatment   Time In 1300            Time Out 1325          Minutes 25             Timed Code Treatment Minutes: 25 Minutes      Bonnie Osborne, PT

## 2018-12-21 NOTE — PROGRESS NOTES
Patient states being anxious about the unknown plans of her d/c. Complains of feeling weak. Will continue to monitor. Call light within reach. Bed in lowest position.

## 2018-12-21 NOTE — DISCHARGE SUMMARY
mouth Daily      calcitRIOL (ROCALTROL) 0.25 MCG capsule Take 0.25 mcg by mouth three times a week Monday, Wednesday and Friday      calcium-vitamin D (OSCAL-500) 500-200 MG-UNIT per tablet Take 1 tablet by mouth daily. The patient was seen and examined on day of discharge and this discharge summary is in conjunction with any daily progress note from day of discharge. Hospital Course: The patient is a 59-year-old female who presented to the ED with complaints of generalized weakness, who also sustained a fall. She noted ongoing h/o dizziness/lightheaded that has been present for several years. She has a h/o cardiomyopathy Takasubo, that was resolved on her last echocardiogram. Cardiology was consulted for bradycardia, which had been asymptomatic. Metoprolol had been stopped prior to admission and the patient had not been taking antihypertensives at home. Her BP was elevated CCB and hydralazine were initiated and titrated with goal SBP of less than 160. Hctz was initiated. There was no need at this time for PPM, as her bradycardia was asymptomatic and HR did respond to changes in position and activity. Orthostats were negative. Carotid US showed stenosis < 50% bilaterally, however, given her hx of falls, she is not an ideal candidate for plavix. The patient preferred SNF for DC. SNF admission was denied by her insurance company, even with the concern that the patient lives alone and has been having recent falls and continued weakness. At this time, the patient will discharge to home with home health. This was discussed with her and she verbalized that she was in agreement with this plan of care. Exam:     BP (!) 149/56   Pulse 65   Temp 98 °F (36.7 °C) (Oral)   Resp 18   Ht 5' (1.524 m)   Wt 136 lb 3.9 oz (61.8 kg)   SpO2 97%   BMI 26.61 kg/m²     General: Alert and oriented. Up at bedside in NAD. Pleasant and cooperative. HEENT: Normocephalic. Pupils equal and reactive. EOM intact.  Oral

## 2018-12-21 NOTE — PROGRESS NOTES
Pt ok for discharge per MD. Discharge instructions and scripts sent electronically per MD. IV removed. Tele monitor removed and returned to dirty hold. Home health arranged per BART, and front wheeled walker delivered to room. No questions or concerns at this time.

## 2019-02-20 ENCOUNTER — OFFICE VISIT (OUTPATIENT)
Dept: RHEUMATOLOGY | Age: 79
End: 2019-02-20
Payer: MEDICARE

## 2019-02-20 VITALS
BODY MASS INDEX: 24.41 KG/M2 | DIASTOLIC BLOOD PRESSURE: 80 MMHG | SYSTOLIC BLOOD PRESSURE: 134 MMHG | HEIGHT: 64 IN | WEIGHT: 143 LBS

## 2019-02-20 DIAGNOSIS — R60.0 LOCALIZED EDEMA: ICD-10-CM

## 2019-02-20 DIAGNOSIS — Z11.59 ENCOUNTER FOR SCREENING FOR OTHER VIRAL DISEASES: ICD-10-CM

## 2019-02-20 DIAGNOSIS — R29.898 WEAKNESS OF BOTH LOWER EXTREMITIES: ICD-10-CM

## 2019-02-20 DIAGNOSIS — M35.3 POLYMYALGIA RHEUMATICA (HCC): ICD-10-CM

## 2019-02-20 DIAGNOSIS — R29.898 WEAKNESS OF BOTH LOWER EXTREMITIES: Primary | ICD-10-CM

## 2019-02-20 LAB
A/G RATIO: 2.2 (ref 1.1–2.2)
ALBUMIN SERPL-MCNC: 4 G/DL (ref 3.4–5)
ALP BLD-CCNC: 58 U/L (ref 40–129)
ALT SERPL-CCNC: 14 U/L (ref 10–40)
ANION GAP SERPL CALCULATED.3IONS-SCNC: 17 MMOL/L (ref 3–16)
AST SERPL-CCNC: 21 U/L (ref 15–37)
BASOPHILS ABSOLUTE: 0 K/UL (ref 0–0.2)
BASOPHILS RELATIVE PERCENT: 0.1 %
BILIRUB SERPL-MCNC: 0.5 MG/DL (ref 0–1)
BUN BLDV-MCNC: 58 MG/DL (ref 7–20)
C-REACTIVE PROTEIN: 0.8 MG/L (ref 0–5.1)
CALCIUM SERPL-MCNC: 9.7 MG/DL (ref 8.3–10.6)
CHLORIDE BLD-SCNC: 101 MMOL/L (ref 99–110)
CO2: 23 MMOL/L (ref 21–32)
CREAT SERPL-MCNC: 1.9 MG/DL (ref 0.6–1.2)
EOSINOPHILS ABSOLUTE: 0 K/UL (ref 0–0.6)
EOSINOPHILS RELATIVE PERCENT: 0 %
GFR AFRICAN AMERICAN: 31
GFR NON-AFRICAN AMERICAN: 25
GLOBULIN: 1.8 G/DL
GLUCOSE BLD-MCNC: 98 MG/DL (ref 70–99)
HCT VFR BLD CALC: 31 % (ref 36–48)
HEMOGLOBIN: 9.8 G/DL (ref 12–16)
HEPATITIS B CORE IGM ANTIBODY: NORMAL
HEPATITIS B SURFACE ANTIGEN INTERPRETATION: NORMAL
HEPATITIS C ANTIBODY INTERPRETATION: NORMAL
LYMPHOCYTES ABSOLUTE: 0.5 K/UL (ref 1–5.1)
LYMPHOCYTES RELATIVE PERCENT: 5.7 %
MCH RBC QN AUTO: 31.2 PG (ref 26–34)
MCHC RBC AUTO-ENTMCNC: 31.7 G/DL (ref 31–36)
MCV RBC AUTO: 98.5 FL (ref 80–100)
MONOCYTES ABSOLUTE: 0.2 K/UL (ref 0–1.3)
MONOCYTES RELATIVE PERCENT: 2.4 %
NEUTROPHILS ABSOLUTE: 8.1 K/UL (ref 1.7–7.7)
NEUTROPHILS RELATIVE PERCENT: 91.8 %
PDW BLD-RTO: 15.9 % (ref 12.4–15.4)
PLATELET # BLD: 236 K/UL (ref 135–450)
PMV BLD AUTO: 9.6 FL (ref 5–10.5)
POTASSIUM SERPL-SCNC: 4.6 MMOL/L (ref 3.5–5.1)
RBC # BLD: 3.15 M/UL (ref 4–5.2)
RHEUMATOID FACTOR: <10 IU/ML
SEDIMENTATION RATE, ERYTHROCYTE: 9 MM/HR (ref 0–30)
SODIUM BLD-SCNC: 141 MMOL/L (ref 136–145)
T4 FREE: 1.4 NG/DL (ref 0.9–1.8)
TOTAL CK: 42 U/L (ref 26–192)
TOTAL PROTEIN: 5.8 G/DL (ref 6.4–8.2)
TSH REFLEX FT4: 4.92 UIU/ML (ref 0.27–4.2)
VITAMIN B-12: 531 PG/ML (ref 211–911)
WBC # BLD: 8.8 K/UL (ref 4–11)

## 2019-02-20 PROCEDURE — 1101F PT FALLS ASSESS-DOCD LE1/YR: CPT | Performed by: INTERNAL MEDICINE

## 2019-02-20 PROCEDURE — G8484 FLU IMMUNIZE NO ADMIN: HCPCS | Performed by: INTERNAL MEDICINE

## 2019-02-20 PROCEDURE — 99205 OFFICE O/P NEW HI 60 MIN: CPT | Performed by: INTERNAL MEDICINE

## 2019-02-20 PROCEDURE — 1123F ACP DISCUSS/DSCN MKR DOCD: CPT | Performed by: INTERNAL MEDICINE

## 2019-02-20 PROCEDURE — 1036F TOBACCO NON-USER: CPT | Performed by: INTERNAL MEDICINE

## 2019-02-20 PROCEDURE — G8420 CALC BMI NORM PARAMETERS: HCPCS | Performed by: INTERNAL MEDICINE

## 2019-02-20 PROCEDURE — G8400 PT W/DXA NO RESULTS DOC: HCPCS | Performed by: INTERNAL MEDICINE

## 2019-02-20 PROCEDURE — G8427 DOCREV CUR MEDS BY ELIG CLIN: HCPCS | Performed by: INTERNAL MEDICINE

## 2019-02-20 PROCEDURE — G8598 ASA/ANTIPLAT THER USED: HCPCS | Performed by: INTERNAL MEDICINE

## 2019-02-20 PROCEDURE — 4040F PNEUMOC VAC/ADMIN/RCVD: CPT | Performed by: INTERNAL MEDICINE

## 2019-02-20 PROCEDURE — 1090F PRES/ABSN URINE INCON ASSESS: CPT | Performed by: INTERNAL MEDICINE

## 2019-02-21 ENCOUNTER — PROCEDURE VISIT (OUTPATIENT)
Dept: NEUROLOGY | Age: 79
End: 2019-02-21
Payer: MEDICARE

## 2019-02-21 DIAGNOSIS — R29.898 BILATERAL LEG WEAKNESS: Primary | ICD-10-CM

## 2019-02-21 PROCEDURE — 95909 NRV CNDJ TST 5-6 STUDIES: CPT | Performed by: PSYCHIATRY & NEUROLOGY

## 2019-02-21 PROCEDURE — 95886 MUSC TEST DONE W/N TEST COMP: CPT | Performed by: PSYCHIATRY & NEUROLOGY

## 2019-02-22 LAB
ALBUMIN SERPL-MCNC: 3.5 G/DL (ref 3.1–4.9)
ALDOLASE: 3.6 U/L (ref 1.5–8.1)
ALPHA-1-GLOBULIN: 0.3 G/DL (ref 0.2–0.4)
ALPHA-2-GLOBULIN: 0.7 G/DL (ref 0.4–1.1)
BETA GLOBULIN: 0.8 G/DL (ref 0.9–1.6)
CCP IGG ANTIBODIES: 2 UNITS (ref 0–19)
GAMMA GLOBULIN: 0.5 G/DL (ref 0.6–1.8)
SPE/IFE INTERPRETATION: NORMAL

## 2019-02-23 LAB
ANCA IFA: NORMAL
MISCELLANEOUS LAB TEST ORDER: NORMAL
MYELOPEROXIDASE AB: 0 AU/ML (ref 0–19)
SERINE PROTEASE 3 AB: 0 AU/ML (ref 0–19)

## 2019-02-27 ENCOUNTER — HOSPITAL ENCOUNTER (OUTPATIENT)
Dept: MRI IMAGING | Age: 79
Discharge: HOME | End: 2019-02-27
Payer: MEDICARE

## 2019-02-27 ENCOUNTER — TELEPHONE (OUTPATIENT)
Dept: INTERNAL MEDICINE CLINIC | Age: 79
End: 2019-02-27

## 2019-02-27 PROCEDURE — 72148 MRI LUMBAR SPINE W/O DYE: CPT

## 2019-02-28 ENCOUNTER — TELEPHONE (OUTPATIENT)
Dept: INTERNAL MEDICINE CLINIC | Age: 79
End: 2019-02-28

## 2019-02-28 DIAGNOSIS — C79.9 METASTATIC DISEASE (HCC): Primary | ICD-10-CM

## 2019-03-12 ENCOUNTER — HOSPITAL ENCOUNTER (OUTPATIENT)
Dept: CT IMAGING | Age: 79
Discharge: HOME | End: 2019-03-12
Payer: MEDICARE

## 2019-03-12 VITALS
HEIGHT: 62 IN | RESPIRATION RATE: 16 BRPM | BODY MASS INDEX: 24.84 KG/M2 | SYSTOLIC BLOOD PRESSURE: 142 MMHG | TEMPERATURE: 96.9 F | OXYGEN SATURATION: 95 % | WEIGHT: 135 LBS | DIASTOLIC BLOOD PRESSURE: 67 MMHG | HEART RATE: 58 BPM

## 2019-03-12 DIAGNOSIS — C80.0 DISSEMINATED MALIGNANT NEOPLASM (HCC): ICD-10-CM

## 2019-03-12 LAB
ANION GAP SERPL CALCULATED.3IONS-SCNC: 18 MMOL/L (ref 3–16)
BASOPHILS ABSOLUTE: 0 K/UL (ref 0–0.2)
BASOPHILS RELATIVE PERCENT: 0.1 %
BUN BLDV-MCNC: 78 MG/DL (ref 7–20)
CALCIUM SERPL-MCNC: 9.8 MG/DL (ref 8.3–10.6)
CHLORIDE BLD-SCNC: 95 MMOL/L (ref 99–110)
CO2: 31 MMOL/L (ref 21–32)
CREAT SERPL-MCNC: 2.5 MG/DL (ref 0.6–1.2)
EOSINOPHILS ABSOLUTE: 0 K/UL (ref 0–0.6)
EOSINOPHILS RELATIVE PERCENT: 0.2 %
GFR AFRICAN AMERICAN: 22
GFR NON-AFRICAN AMERICAN: 19
GLUCOSE BLD-MCNC: 88 MG/DL (ref 70–99)
HCT VFR BLD CALC: 32.3 % (ref 36–48)
HCT VFR BLD CALC: 32.6 % (ref 36–48)
HEMOGLOBIN: 10.7 G/DL (ref 12–16)
IMMATURE RETIC FRACT: 0.4 (ref 0.21–0.37)
INR BLD: 0.91 (ref 0.86–1.14)
LYMPHOCYTES ABSOLUTE: 1 K/UL (ref 1–5.1)
LYMPHOCYTES RELATIVE PERCENT: 11.2 %
MCH RBC QN AUTO: 31.3 PG (ref 26–34)
MCHC RBC AUTO-ENTMCNC: 33 G/DL (ref 31–36)
MCV RBC AUTO: 94.7 FL (ref 80–100)
MONOCYTES ABSOLUTE: 1 K/UL (ref 0–1.3)
MONOCYTES RELATIVE PERCENT: 10.7 %
NEUTROPHILS ABSOLUTE: 7.3 K/UL (ref 1.7–7.7)
NEUTROPHILS RELATIVE PERCENT: 77.8 %
PDW BLD-RTO: 14.7 % (ref 12.4–15.4)
PLATELET # BLD: 237 K/UL (ref 135–450)
PMV BLD AUTO: 8.9 FL (ref 5–10.5)
POTASSIUM SERPL-SCNC: 3 MMOL/L (ref 3.5–5.1)
PROTHROMBIN TIME: 10.4 SEC (ref 9.8–13)
RBC # BLD: 3.41 M/UL (ref 4–5.2)
RETICULOCYTE ABSOLUTE COUNT: 0.04 M/UL (ref 0.02–0.1)
RETICULOCYTE COUNT PCT: 1.09 % (ref 0.5–2.18)
SODIUM BLD-SCNC: 144 MMOL/L (ref 136–145)
WBC # BLD: 9.4 K/UL (ref 4–11)

## 2019-03-12 PROCEDURE — 88342 IMHCHEM/IMCYTCHM 1ST ANTB: CPT

## 2019-03-12 PROCEDURE — 88341 IMHCHEM/IMCYTCHM EA ADD ANTB: CPT

## 2019-03-12 PROCEDURE — 88313 SPECIAL STAINS GROUP 2: CPT

## 2019-03-12 PROCEDURE — 80048 BASIC METABOLIC PNL TOTAL CA: CPT

## 2019-03-12 PROCEDURE — 88184 FLOWCYTOMETRY/ TC 1 MARKER: CPT

## 2019-03-12 PROCEDURE — 6360000002 HC RX W HCPCS: Performed by: RADIOLOGY

## 2019-03-12 PROCEDURE — 6360000004 HC RX CONTRAST MEDICATION: Performed by: INTERNAL MEDICINE

## 2019-03-12 PROCEDURE — 88185 FLOWCYTOMETRY/TC ADD-ON: CPT

## 2019-03-12 PROCEDURE — 88311 DECALCIFY TISSUE: CPT

## 2019-03-12 PROCEDURE — 85025 COMPLETE CBC W/AUTO DIFF WBC: CPT

## 2019-03-12 PROCEDURE — 85610 PROTHROMBIN TIME: CPT

## 2019-03-12 PROCEDURE — 2709999900 CT BIOPSY BONE MARROW

## 2019-03-12 PROCEDURE — 85045 AUTOMATED RETICULOCYTE COUNT: CPT

## 2019-03-12 PROCEDURE — 36415 COLL VENOUS BLD VENIPUNCTURE: CPT

## 2019-03-12 PROCEDURE — 77012 CT SCAN FOR NEEDLE BIOPSY: CPT

## 2019-03-12 PROCEDURE — 71250 CT THORAX DX C-: CPT

## 2019-03-12 PROCEDURE — 7100000010 HC PHASE II RECOVERY - FIRST 15 MIN

## 2019-03-12 PROCEDURE — 88305 TISSUE EXAM BY PATHOLOGIST: CPT

## 2019-03-12 PROCEDURE — 7100000011 HC PHASE II RECOVERY - ADDTL 15 MIN

## 2019-03-12 RX ORDER — MIDAZOLAM HYDROCHLORIDE 1 MG/ML
INJECTION INTRAMUSCULAR; INTRAVENOUS DAILY PRN
Status: COMPLETED | OUTPATIENT
Start: 2019-03-12 | End: 2019-03-12

## 2019-03-12 RX ORDER — ACETAMINOPHEN 325 MG/1
650 TABLET ORAL EVERY 4 HOURS PRN
Status: DISCONTINUED | OUTPATIENT
Start: 2019-03-12 | End: 2019-03-13 | Stop reason: HOSPADM

## 2019-03-12 RX ORDER — FENTANYL CITRATE 50 UG/ML
INJECTION, SOLUTION INTRAMUSCULAR; INTRAVENOUS DAILY PRN
Status: COMPLETED | OUTPATIENT
Start: 2019-03-12 | End: 2019-03-12

## 2019-03-12 RX ADMIN — IOHEXOL 50 ML: 240 INJECTION, SOLUTION INTRATHECAL; INTRAVASCULAR; INTRAVENOUS; ORAL at 13:03

## 2019-03-12 RX ADMIN — MIDAZOLAM 1 MG: 1 INJECTION INTRAMUSCULAR; INTRAVENOUS at 11:43

## 2019-03-12 RX ADMIN — FENTANYL CITRATE 50 MCG: 50 INJECTION INTRAMUSCULAR; INTRAVENOUS at 11:43

## 2019-03-12 ASSESSMENT — PAIN SCALES - GENERAL
PAINLEVEL_OUTOF10: 0

## 2019-03-12 ASSESSMENT — PAIN - FUNCTIONAL ASSESSMENT: PAIN_FUNCTIONAL_ASSESSMENT: 0-10

## 2019-03-19 LAB
Lab: NORMAL
REPORT: NORMAL
THIS TEST SENT TO: NORMAL

## 2021-10-07 NOTE — CARE COORDINATION
BART Consult:Home Care Services. BART met with JUANO. [place] daughter in law, she and her  are POA. [facility] is [facility]. Her RN found patient this morning. BART discussed patient resuming Home Care when discharged from the hospital.  BART also provided information on [facility] to [address] daughter. Replied with below, will await response.